# Patient Record
Sex: MALE | Race: WHITE | NOT HISPANIC OR LATINO | Employment: FULL TIME | URBAN - METROPOLITAN AREA
[De-identification: names, ages, dates, MRNs, and addresses within clinical notes are randomized per-mention and may not be internally consistent; named-entity substitution may affect disease eponyms.]

---

## 2019-04-16 ENCOUNTER — TELEPHONE (OUTPATIENT)
Dept: INTERNAL MEDICINE | Facility: CLINIC | Age: 64
End: 2019-04-16

## 2019-09-03 RX ORDER — ALBUTEROL SULFATE 90 UG/1
AEROSOL, METERED RESPIRATORY (INHALATION)
Qty: 54 G | Refills: 0 | Status: SHIPPED | OUTPATIENT
Start: 2019-09-03 | End: 2019-10-22 | Stop reason: SDUPTHER

## 2019-10-23 RX ORDER — ALBUTEROL SULFATE 90 UG/1
AEROSOL, METERED RESPIRATORY (INHALATION)
Qty: 54 G | Refills: 0 | Status: SHIPPED | OUTPATIENT
Start: 2019-10-23 | End: 2019-12-30

## 2019-11-22 PROBLEM — J98.01 BRONCHOSPASM: Status: ACTIVE | Noted: 2019-11-22

## 2019-11-22 PROBLEM — K57.90 DIVERTICULOSIS: Status: ACTIVE | Noted: 2019-11-22

## 2019-11-22 PROBLEM — N40.0 BENIGN PROSTATIC HYPERPLASIA WITHOUT LOWER URINARY TRACT SYMPTOMS: Status: ACTIVE | Noted: 2019-11-22

## 2019-11-22 PROBLEM — Z00.00 ROUTINE MEDICAL EXAM: Status: ACTIVE | Noted: 2019-11-22

## 2019-11-22 PROBLEM — R33.9 URINARY RETENTION: Status: ACTIVE | Noted: 2019-11-22

## 2019-11-22 PROBLEM — K64.8 INTERNAL HEMORRHOIDS: Status: ACTIVE | Noted: 2019-11-22

## 2019-12-11 ENCOUNTER — OFFICE VISIT (OUTPATIENT)
Dept: INTERNAL MEDICINE | Facility: CLINIC | Age: 64
End: 2019-12-11

## 2019-12-11 ENCOUNTER — LAB (OUTPATIENT)
Dept: LAB | Facility: HOSPITAL | Age: 64
End: 2019-12-11

## 2019-12-11 VITALS
HEIGHT: 71 IN | WEIGHT: 209 LBS | HEART RATE: 76 BPM | DIASTOLIC BLOOD PRESSURE: 90 MMHG | BODY MASS INDEX: 29.26 KG/M2 | SYSTOLIC BLOOD PRESSURE: 150 MMHG | TEMPERATURE: 95.9 F

## 2019-12-11 DIAGNOSIS — J98.01 BRONCHOSPASM: ICD-10-CM

## 2019-12-11 DIAGNOSIS — Z12.5 SCREENING FOR PROSTATE CANCER: ICD-10-CM

## 2019-12-11 DIAGNOSIS — E78.2 MIXED HYPERLIPIDEMIA: Primary | ICD-10-CM

## 2019-12-11 DIAGNOSIS — R73.01 IMPAIRED FASTING GLUCOSE: ICD-10-CM

## 2019-12-11 DIAGNOSIS — E78.2 MIXED HYPERLIPIDEMIA: ICD-10-CM

## 2019-12-11 DIAGNOSIS — Z00.00 ROUTINE MEDICAL EXAM: ICD-10-CM

## 2019-12-11 DIAGNOSIS — R03.0 ELEVATED BLOOD PRESSURE READING: ICD-10-CM

## 2019-12-11 PROCEDURE — 90471 IMMUNIZATION ADMIN: CPT | Performed by: NURSE PRACTITIONER

## 2019-12-11 PROCEDURE — G0103 PSA SCREENING: HCPCS

## 2019-12-11 PROCEDURE — 80061 LIPID PANEL: CPT

## 2019-12-11 PROCEDURE — 83036 HEMOGLOBIN GLYCOSYLATED A1C: CPT

## 2019-12-11 PROCEDURE — 99396 PREV VISIT EST AGE 40-64: CPT | Performed by: NURSE PRACTITIONER

## 2019-12-11 PROCEDURE — 93000 ELECTROCARDIOGRAM COMPLETE: CPT | Performed by: NURSE PRACTITIONER

## 2019-12-11 PROCEDURE — 82043 UR ALBUMIN QUANTITATIVE: CPT

## 2019-12-11 PROCEDURE — 90674 CCIIV4 VAC NO PRSV 0.5 ML IM: CPT | Performed by: NURSE PRACTITIONER

## 2019-12-11 PROCEDURE — 84443 ASSAY THYROID STIM HORMONE: CPT

## 2019-12-11 PROCEDURE — 85025 COMPLETE CBC W/AUTO DIFF WBC: CPT

## 2019-12-11 PROCEDURE — 82570 ASSAY OF URINE CREATININE: CPT

## 2019-12-11 PROCEDURE — 80053 COMPREHEN METABOLIC PANEL: CPT

## 2019-12-11 NOTE — PROGRESS NOTES
Luis Felipe Siegel  1955  [id number]  Patient Care Team:  Vi Solis APRN as PCP - General (Internal Medicine)  Provider, No Known as PCP - Family Medicine    Luis Felipe Siegel is a 64 y.o. pleasant male here today for annual physical.  Chief Complaint   Patient presents with   • Annual Exam     Patient is fasting       HPI:   Golfing some, walking over a mile from parking to work at [employer]  Noam:  Sees every April  Cat allergy:  Uses proair for wheezing dyspnea prn with cat allergen at home for years    Elevated bp, new:  Not high at other appts (dentist 3 x yr and Noam appt), no sx of HA, vision change, cp, sob, leg swelling, high in office today  BMI consistant with years prior.  etoh 2 beers per day (no change), no diet changes or change in activity level      Past Medical History:   Diagnosis Date   • Asthma    • Bronchospasm      to Animal Dander   • Positive PPD     in 1983     History reviewed. No pertinent surgical history.  Family History   Problem Relation Age of Onset   • COPD Mother    • Cancer Mother         lung?   • Atrial fibrillation Father    • Hypertension Father      Social History     Tobacco Use   Smoking Status Never Smoker   Smokeless Tobacco Never Used     No Known Allergies    Current Outpatient Medications:   •  tamsulosin (FLOMAX) 0.4 MG capsule 24 hr capsule, Take 1 capsule by mouth Every Night., Disp: , Rfl:   •  VENTOLIN  (90 Base) MCG/ACT inhaler, USE 2 PUFFS BY MOUTH EVERY  4 TO 6 HOURS AS NEEDED, Disp: 54 g, Rfl: 0    Review of Systems   Constitutional: Negative for chills, fatigue and fever.   HENT: Negative for congestion, ear pain and sinus pressure.    Respiratory: Negative for cough, chest tightness, shortness of breath and wheezing.    Cardiovascular: Negative for chest pain and palpitations.   Gastrointestinal: Negative for abdominal pain, blood in stool and constipation.   Skin: Negative for color change.   Allergic/Immunologic: Positive for environmental  "allergies.   Neurological: Negative for dizziness, speech difficulty and headache.   Psychiatric/Behavioral: Negative for decreased concentration. The patient is not nervous/anxious.        /90 (BP Location: Left arm)   Pulse 76   Temp 95.9 °F (35.5 °C) (Temporal)   Ht 180 cm (70.87\")   Wt 94.8 kg (209 lb)   BMI 29.26 kg/m²     Physical Exam   Constitutional: He appears well-developed and well-nourished.   HENT:   Head: Normocephalic and atraumatic.   Right Ear: External ear normal.   Left Ear: External ear normal.   Mouth/Throat: Oropharynx is clear and moist.   Eyes: Conjunctivae and EOM are normal.   Neck: Normal range of motion. Neck supple.   Cardiovascular: Normal rate, regular rhythm, normal heart sounds and intact distal pulses.   Pulmonary/Chest: Effort normal and breath sounds normal.   Abdominal: Soft. Bowel sounds are normal.   Musculoskeletal: Normal range of motion.   Neurological: He is alert.   Skin: Skin is warm and dry.   Psychiatric: He has a normal mood and affect. His behavior is normal. Thought content normal.       Results Review:  I reviewed the patient's new clinical results.      ECG 12 Lead  Date/Time: 12/11/2019 3:59 PM  Performed by: Vi Solis APRN  Authorized by: Vi Solis APRN   Comparison: compared with previous ECG from 11/14/2012  Comparison to previous ECG: NSR HR 66  Rhythm: sinus rhythm  Rate: normal  BPM: 67  QRS axis: normal    Clinical impression: normal ECG             Assessment/Plan:  Luis Felipe was seen today for annual exam.    Diagnoses and all orders for this visit:    Mixed hyperlipidemia  Comments:  Labs today, never on meds  Orders:  -     CBC & Differential; Future  -     Comprehensive Metabolic Panel; Future  -     Lipid Panel; Future  -     TSH Rfx On Abnormal To Free T4; Future  -     Microalbumin / Creatinine Urine Ratio - Urine, Clean Catch; Future    Impaired fasting glucose  Comments:  A1c today  Orders:  -     Hemoglobin A1c; " Future    Screening for prostate cancer  Comments:  PSA today, yearly visits with Dr. Boo  Orders:  -     PSA Screen; Future    Bronchospasm  Comments:  Use pro-air as needed    BMI 29.0-29.9,adult    Routine medical exam    Elevated blood pressure reading  Comments:  See below    Other orders  -     Flucelvax Quad=>4Years (PFS)  -     ECG 12 Lead         Patient Instructions   Consider Shingrix vaccine when available.    EKG today.  Is fasting for labs and will go this afternoon.  To see Dr. Santacruz for urology yearly.  Continue Flomax    bp higher than usual in office today.  Check BP at home and keep a log for your next visit.    •In general, adults should engage in aerobic physical activity 3-4 times a week with each session lasting an average of 40 minutes.  Goal for 150 minutes per week total.  •Moderate (brisk walking or jogging) to vigorous (running or biking) physical activity is recommended.  •There are many helpful strategies for heart-healthy eating, including the DASH diet and the [employer]'s Choose My Plate.   •Patients with high blood pressure should consume no more than 2,400 mg of sodium a day, ideally reducing sodium intake to 1,500 mg a day. However, even reducing sodium intake in one's current diet by 1,000 mg each day can help lower blood pressure.    Limit alcohol consumption.    Information obtained from the American College of Cardiology and American Heart Association.          Counseling/Anticipatory Guidance:   Plan of care reviewed with patient at the conclusion of today's visit. Education was provided in regards to diagnosis, diet and exercise, prostate cancer screening discussed including benefit of early detection, potential need for follow-up, and harms associated with additional management. Patient agrees to screening.    Nutrition, physical activity, healthy weight,ways to reduce stress, adequate sleep, injury prevention, misuse of tobacco, alcohol and drugs, sexual behavior and STD's,  dental health, mental health, and immunizations.    Plan of care reviewed with patient at the conclusion of today's visit. Education was provided regarding diagnosis, management and any prescribed or recommended OTC medications.  Patient verbalizes understanding of and agreement with management plan.    Return in about 1 year (around 12/11/2020) for Labs this visit, Annual physical.    * Please note that portions of this note were completed with a voice recognition program. Efforts were made to edit the dictation but occasionally words are transcribed.     Vi Solis, APRN

## 2019-12-11 NOTE — PATIENT INSTRUCTIONS
Consider Shingrix vaccine when available.    EKG today.  Is fasting for labs and will go this afternoon.  To see Dr. Santacruz for urology yearly.  Continue Flomax    bp higher than usual in office today.  Check BP at home and keep a log for your next visit.    •In general, adults should engage in aerobic physical activity 3-4 times a week with each session lasting an average of 40 minutes.  Goal for 150 minutes per week total.  •Moderate (brisk walking or jogging) to vigorous (running or biking) physical activity is recommended.  •There are many helpful strategies for heart-healthy eating, including the DASH diet and the [employer]'s Choose My Plate.   •Patients with high blood pressure should consume no more than 2,400 mg of sodium a day, ideally reducing sodium intake to 1,500 mg a day. However, even reducing sodium intake in one's current diet by 1,000 mg each day can help lower blood pressure.    Limit alcohol consumption.    Information obtained from the American College of Cardiology and American Heart Association.

## 2019-12-12 LAB
ALBUMIN SERPL-MCNC: 4.7 G/DL (ref 3.5–5.2)
ALBUMIN UR-MCNC: 1.9 MG/DL
ALBUMIN/GLOB SERPL: 1.3 G/DL
ALP SERPL-CCNC: 73 U/L (ref 39–117)
ALT SERPL W P-5'-P-CCNC: 26 U/L (ref 1–41)
ANION GAP SERPL CALCULATED.3IONS-SCNC: 11.3 MMOL/L (ref 5–15)
AST SERPL-CCNC: 20 U/L (ref 1–40)
BASOPHILS # BLD AUTO: 0.07 10*3/MM3 (ref 0–0.2)
BASOPHILS NFR BLD AUTO: 1.1 % (ref 0–1.5)
BILIRUB SERPL-MCNC: 0.6 MG/DL (ref 0.2–1.2)
BUN BLD-MCNC: 17 MG/DL (ref 8–23)
BUN/CREAT SERPL: 18.3 (ref 7–25)
CALCIUM SPEC-SCNC: 9.1 MG/DL (ref 8.6–10.5)
CHLORIDE SERPL-SCNC: 100 MMOL/L (ref 98–107)
CHOLEST SERPL-MCNC: 189 MG/DL (ref 0–200)
CO2 SERPL-SCNC: 27.7 MMOL/L (ref 22–29)
CREAT BLD-MCNC: 0.93 MG/DL (ref 0.76–1.27)
CREAT UR-MCNC: 175.1 MG/DL
DEPRECATED RDW RBC AUTO: 45.5 FL (ref 37–54)
EOSINOPHIL # BLD AUTO: 0.15 10*3/MM3 (ref 0–0.4)
EOSINOPHIL NFR BLD AUTO: 2.3 % (ref 0.3–6.2)
ERYTHROCYTE [DISTWIDTH] IN BLOOD BY AUTOMATED COUNT: 13.2 % (ref 12.3–15.4)
GFR SERPL CREATININE-BSD FRML MDRD: 82 ML/MIN/1.73
GLOBULIN UR ELPH-MCNC: 3.5 GM/DL
GLUCOSE BLD-MCNC: 93 MG/DL (ref 65–99)
HBA1C MFR BLD: 5.93 % (ref 4.8–5.6)
HCT VFR BLD AUTO: 43.9 % (ref 37.5–51)
HDLC SERPL-MCNC: 52 MG/DL (ref 40–60)
HGB BLD-MCNC: 15.1 G/DL (ref 13–17.7)
IMM GRANULOCYTES # BLD AUTO: 0.01 10*3/MM3 (ref 0–0.05)
IMM GRANULOCYTES NFR BLD AUTO: 0.2 % (ref 0–0.5)
LDLC SERPL CALC-MCNC: 105 MG/DL (ref 0–100)
LDLC/HDLC SERPL: 2.02 {RATIO}
LYMPHOCYTES # BLD AUTO: 1.88 10*3/MM3 (ref 0.7–3.1)
LYMPHOCYTES NFR BLD AUTO: 29.4 % (ref 19.6–45.3)
MCH RBC QN AUTO: 31.9 PG (ref 26.6–33)
MCHC RBC AUTO-ENTMCNC: 34.4 G/DL (ref 31.5–35.7)
MCV RBC AUTO: 92.6 FL (ref 79–97)
MICROALBUMIN/CREAT UR: 10.9 MG/G
MONOCYTES # BLD AUTO: 0.48 10*3/MM3 (ref 0.1–0.9)
MONOCYTES NFR BLD AUTO: 7.5 % (ref 5–12)
NEUTROPHILS # BLD AUTO: 3.81 10*3/MM3 (ref 1.7–7)
NEUTROPHILS NFR BLD AUTO: 59.5 % (ref 42.7–76)
NRBC BLD AUTO-RTO: 0 /100 WBC (ref 0–0.2)
PLATELET # BLD AUTO: 257 10*3/MM3 (ref 140–450)
PMV BLD AUTO: 9.4 FL (ref 6–12)
POTASSIUM BLD-SCNC: 4.5 MMOL/L (ref 3.5–5.2)
PROT SERPL-MCNC: 8.2 G/DL (ref 6–8.5)
PSA SERPL-MCNC: 1.05 NG/ML (ref 0–4)
RBC # BLD AUTO: 4.74 10*6/MM3 (ref 4.14–5.8)
SODIUM BLD-SCNC: 139 MMOL/L (ref 136–145)
TRIGL SERPL-MCNC: 161 MG/DL (ref 0–150)
TSH SERPL DL<=0.05 MIU/L-ACNC: 3.68 UIU/ML (ref 0.27–4.2)
VLDLC SERPL-MCNC: 32.2 MG/DL (ref 5–40)
WBC NRBC COR # BLD: 6.4 10*3/MM3 (ref 3.4–10.8)

## 2019-12-30 RX ORDER — ALBUTEROL SULFATE 90 UG/1
AEROSOL, METERED RESPIRATORY (INHALATION)
Qty: 54 G | Refills: 0 | Status: SHIPPED | OUTPATIENT
Start: 2019-12-30 | End: 2020-04-01

## 2020-01-04 ENCOUNTER — HOSPITAL ENCOUNTER (EMERGENCY)
Facility: HOSPITAL | Age: 65
Discharge: HOME | End: 2020-01-04
Attending: EMERGENCY MEDICINE | Admitting: EMERGENCY MEDICINE

## 2020-01-04 VITALS
BODY MASS INDEX: 29.4 KG/M2 | DIASTOLIC BLOOD PRESSURE: 85 MMHG | OXYGEN SATURATION: 100 % | WEIGHT: 210 LBS | RESPIRATION RATE: 18 BRPM | HEIGHT: 71 IN | SYSTOLIC BLOOD PRESSURE: 116 MMHG | HEART RATE: 78 BPM | TEMPERATURE: 98.1 F

## 2020-01-04 DIAGNOSIS — R33.8 ACUTE URINARY RETENTION: Primary | ICD-10-CM

## 2020-01-04 DIAGNOSIS — Z87.438 HISTORY OF BPH: ICD-10-CM

## 2020-01-04 LAB
ALBUMIN SERPL-MCNC: 4.8 G/DL (ref 3.5–5.2)
ALBUMIN/GLOB SERPL: 1.5 G/DL
ALP SERPL-CCNC: 84 U/L (ref 39–117)
ALT SERPL W P-5'-P-CCNC: 19 U/L (ref 1–41)
ANION GAP SERPL CALCULATED.3IONS-SCNC: 12 MMOL/L (ref 5–15)
AST SERPL-CCNC: 21 U/L (ref 1–40)
BACTERIA UR QL AUTO: ABNORMAL /HPF
BASOPHILS # BLD AUTO: 0.08 10*3/MM3 (ref 0–0.2)
BASOPHILS NFR BLD AUTO: 1.1 % (ref 0–1.5)
BILIRUB SERPL-MCNC: 0.5 MG/DL (ref 0.2–1.2)
BILIRUB UR QL STRIP: NEGATIVE
BUN BLD-MCNC: 22 MG/DL (ref 8–23)
BUN/CREAT SERPL: 23.7 (ref 7–25)
CALCIUM SPEC-SCNC: 9 MG/DL (ref 8.6–10.5)
CHLORIDE SERPL-SCNC: 102 MMOL/L (ref 98–107)
CLARITY UR: CLEAR
CO2 SERPL-SCNC: 25 MMOL/L (ref 22–29)
COLOR UR: YELLOW
CREAT BLD-MCNC: 0.93 MG/DL (ref 0.76–1.27)
DEPRECATED RDW RBC AUTO: 43.8 FL (ref 37–54)
EOSINOPHIL # BLD AUTO: 0.22 10*3/MM3 (ref 0–0.4)
EOSINOPHIL NFR BLD AUTO: 3 % (ref 0.3–6.2)
ERYTHROCYTE [DISTWIDTH] IN BLOOD BY AUTOMATED COUNT: 12.7 % (ref 12.3–15.4)
GFR SERPL CREATININE-BSD FRML MDRD: 82 ML/MIN/1.73
GLOBULIN UR ELPH-MCNC: 3.2 GM/DL
GLUCOSE BLD-MCNC: 100 MG/DL (ref 65–99)
GLUCOSE UR STRIP-MCNC: NEGATIVE MG/DL
HCT VFR BLD AUTO: 42.8 % (ref 37.5–51)
HGB BLD-MCNC: 14.6 G/DL (ref 13–17.7)
HGB UR QL STRIP.AUTO: ABNORMAL
HYALINE CASTS UR QL AUTO: ABNORMAL /LPF
IMM GRANULOCYTES # BLD AUTO: 0.02 10*3/MM3 (ref 0–0.05)
IMM GRANULOCYTES NFR BLD AUTO: 0.3 % (ref 0–0.5)
KETONES UR QL STRIP: NEGATIVE
LEUKOCYTE ESTERASE UR QL STRIP.AUTO: NEGATIVE
LYMPHOCYTES # BLD AUTO: 2.71 10*3/MM3 (ref 0.7–3.1)
LYMPHOCYTES NFR BLD AUTO: 36.4 % (ref 19.6–45.3)
MCH RBC QN AUTO: 32.1 PG (ref 26.6–33)
MCHC RBC AUTO-ENTMCNC: 34.1 G/DL (ref 31.5–35.7)
MCV RBC AUTO: 94.1 FL (ref 79–97)
MONOCYTES # BLD AUTO: 0.65 10*3/MM3 (ref 0.1–0.9)
MONOCYTES NFR BLD AUTO: 8.7 % (ref 5–12)
NEUTROPHILS # BLD AUTO: 3.76 10*3/MM3 (ref 1.7–7)
NEUTROPHILS NFR BLD AUTO: 50.5 % (ref 42.7–76)
NITRITE UR QL STRIP: NEGATIVE
NRBC BLD AUTO-RTO: 0 /100 WBC (ref 0–0.2)
PH UR STRIP.AUTO: <=5 [PH] (ref 5–8)
PLATELET # BLD AUTO: 244 10*3/MM3 (ref 140–450)
PMV BLD AUTO: 8.8 FL (ref 6–12)
POTASSIUM BLD-SCNC: 3.7 MMOL/L (ref 3.5–5.2)
PROT SERPL-MCNC: 8 G/DL (ref 6–8.5)
PROT UR QL STRIP: NEGATIVE
RBC # BLD AUTO: 4.55 10*6/MM3 (ref 4.14–5.8)
RBC # UR: ABNORMAL /HPF
REF LAB TEST METHOD: ABNORMAL
SODIUM BLD-SCNC: 139 MMOL/L (ref 136–145)
SP GR UR STRIP: 1.01 (ref 1–1.03)
SQUAMOUS #/AREA URNS HPF: ABNORMAL /HPF
UROBILINOGEN UR QL STRIP: ABNORMAL
WBC NRBC COR # BLD: 7.44 10*3/MM3 (ref 3.4–10.8)
WBC UR QL AUTO: ABNORMAL /HPF

## 2020-01-04 PROCEDURE — 80053 COMPREHEN METABOLIC PANEL: CPT | Performed by: PHYSICIAN ASSISTANT

## 2020-01-04 PROCEDURE — 99283 EMERGENCY DEPT VISIT LOW MDM: CPT

## 2020-01-04 PROCEDURE — 51702 INSERT TEMP BLADDER CATH: CPT

## 2020-01-04 PROCEDURE — 85025 COMPLETE CBC W/AUTO DIFF WBC: CPT | Performed by: PHYSICIAN ASSISTANT

## 2020-01-04 PROCEDURE — 81001 URINALYSIS AUTO W/SCOPE: CPT | Performed by: PHYSICIAN ASSISTANT

## 2020-01-04 PROCEDURE — 51798 US URINE CAPACITY MEASURE: CPT

## 2020-01-04 RX ORDER — LIDOCAINE HYDROCHLORIDE 20 MG/ML
11 JELLY TOPICAL ONCE
Status: COMPLETED | OUTPATIENT
Start: 2020-01-04 | End: 2020-01-04

## 2020-01-04 RX ADMIN — LIDOCAINE HYDROCHLORIDE 11 ML: 20 JELLY TOPICAL at 20:13

## 2020-01-05 NOTE — DISCHARGE INSTRUCTIONS
Have ER evaluation revealed acute urinary retention.  Patient with history of benign prostatic hypertrophy.  Continue with Flomax.  Post void residual is 84 mL and patient is urinating well on his own.  Recommend  close follow-up with Dr. Santacruz, routine urologist, for recheck.  Return if any worsening symptoms.

## 2020-01-05 NOTE — ED PROVIDER NOTES
"August Siegel is a 63 year old male complaining of urinary retention. The patient reports that for the past few days, he had a mildly \"hesitant stream,\" but that this was not enough to become concerning. However, this afternoon his urinary retention worsened significantly with only \"dribbles\" of urine throughout the day. He decided to come to the ED this evening for further evaluation of his urinary retention. The patient is uncertain of history of an enlarged prostate, though he does take Flomax per Dr. Santacruz. He presently denies any dysuria, but reports bladder distention and discomfort. He also denies a history of any abdominal surgeries. However, he does state that he had a Franco placed four years ago with significant difficulty. His urologist is Dr. Santacruz. His primary care physician is Vi Solis. There are no other acute complaints at this time.   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  He was actually painting some in his home and he had 3-4 beers.  Patient questions whether this affected his urinary output      History provided by:  Patient  Urinary Retention   Location:  N/A  Quality:  \"Dribbles\" of urine.  Severity:  Moderate  Onset quality:  Sudden  Timing:  Constant  Progression:  Worsening  Chronicity:  New  Relieved by:  None tried.  Ineffective treatments:  None tried.  Associated symptoms: abdominal pain (bladder distention)    Associated symptoms: no diarrhea, no fever, no nausea and no vomiting        Review of Systems   Constitutional: Negative for chills and fever.   Gastrointestinal: Positive for abdominal pain (bladder distention). Negative for constipation, diarrhea, nausea and vomiting.   Genitourinary: Positive for decreased urine volume and difficulty urinating. Negative for dysuria and hematuria.        Acute urinary retention   Musculoskeletal: Negative for back pain.   All other systems reviewed and are " negative.      Past Medical History:   Diagnosis Date   • Asthma    • Bronchospasm      to Animal Dander   • Positive PPD     in 1983       No Known Allergies    History reviewed. No pertinent surgical history.    Family History   Problem Relation Age of Onset   • COPD Mother    • Cancer Mother         lung?   • Atrial fibrillation Father    • Hypertension Father        Social History     Socioeconomic History   • Marital status:      Spouse name: Not on file   • Number of children: Not on file   • Years of education: Not on file   • Highest education level: Not on file   Tobacco Use   • Smoking status: Never Smoker   • Smokeless tobacco: Never Used   Substance and Sexual Activity   • Alcohol use: Yes     Alcohol/week: 7.0 - 14.0 standard drinks     Types: 7 - 14 Cans of beer per week     Frequency: Never   • Drug use: Never         Objective   Physical Exam   Constitutional: He is oriented to person, place, and time. He appears well-developed and well-nourished.   Appears uncomfortable secondary to bladder distension.   HENT:   Head: Normocephalic and atraumatic.   Nose: Nose normal.   Eyes: Conjunctivae are normal. No scleral icterus.   Neck: Normal range of motion. Neck supple.   Cardiovascular: Normal rate and regular rhythm.   No murmur heard.  Pulmonary/Chest: Effort normal and breath sounds normal. No respiratory distress.   Abdominal: Soft. He exhibits distension (Bladder distention noted with tenderness.). There is tenderness. There is no CVA tenderness.   Bowel sounds are active. No flank tenderness present.   Musculoskeletal: Normal range of motion.   Neurological: He is alert and oriented to person, place, and time.   Skin: Skin is warm and dry.   Psychiatric: He has a normal mood and affect. His behavior is normal.   Nursing note and vitals reviewed.      Procedures         ED Course  ED Course as of Jan 04 2144   Sat Jan 04, 2020 2020 Nursing staff had difficulty with catheterization due to  enlarged prostate.  Urojet was applied and patient actually had a large amount of urine output on his own.  We will perform bladder scan and assess post void residual.  If there is still quite a bit of residual urine, we will attempt with repeat catheterization.  Patient did have some relief with bladder distention.  CBC and chemistries were completely within normal limits.    [FC]   2058 Urinalysis revealed 13-20 red blood cells, but 0-2 white blood cells and no bacteria.  Leukocytes and nitrite were negative.    [FC]   2129 At bedside reevaluating the patient and updating on lab and imaging results. The patient states that he has urinated three times since being in the ER and feels significantly improved. He feels comfortable going home at this time.--FC    [RF]   2137 During the ER evaluation.  Bladder scan after post void residual is 84 mL of urine.  I reevaluated patient and he is feeling markedly improved.  He requests to be discharged home.  Catheterization attempt here in the ER tonight was difficult, and patient does not want to go through that again since he is urinating on his own.  He said that if symptoms worsen, he will return and I advised him to call his routine urologist, Dr. Santacruz for first available appointment.  Patient is agreeable.    [FC]      ED Course User Index  [FC] Marisela Barahona, ORION  [RF] Ashwini Chris     Recent Results (from the past 24 hour(s))   Comprehensive Metabolic Panel    Collection Time: 01/04/20  7:36 PM   Result Value Ref Range    Glucose 100 (H) 65 - 99 mg/dL    BUN 22 8 - 23 mg/dL    Creatinine 0.93 0.76 - 1.27 mg/dL    Sodium 139 136 - 145 mmol/L    Potassium 3.7 3.5 - 5.2 mmol/L    Chloride 102 98 - 107 mmol/L    CO2 25.0 22.0 - 29.0 mmol/L    Calcium 9.0 8.6 - 10.5 mg/dL    Total Protein 8.0 6.0 - 8.5 g/dL    Albumin 4.80 3.50 - 5.20 g/dL    ALT (SGPT) 19 1 - 41 U/L    AST (SGOT) 21 1 - 40 U/L    Alkaline Phosphatase 84 39 - 117 U/L    Total Bilirubin 0.5 0.2 - 1.2  mg/dL    eGFR Non African Amer 82 >60 mL/min/1.73    Globulin 3.2 gm/dL    A/G Ratio 1.5 g/dL    BUN/Creatinine Ratio 23.7 7.0 - 25.0    Anion Gap 12.0 5.0 - 15.0 mmol/L   CBC Auto Differential    Collection Time: 01/04/20  7:36 PM   Result Value Ref Range    WBC 7.44 3.40 - 10.80 10*3/mm3    RBC 4.55 4.14 - 5.80 10*6/mm3    Hemoglobin 14.6 13.0 - 17.7 g/dL    Hematocrit 42.8 37.5 - 51.0 %    MCV 94.1 79.0 - 97.0 fL    MCH 32.1 26.6 - 33.0 pg    MCHC 34.1 31.5 - 35.7 g/dL    RDW 12.7 12.3 - 15.4 %    RDW-SD 43.8 37.0 - 54.0 fl    MPV 8.8 6.0 - 12.0 fL    Platelets 244 140 - 450 10*3/mm3    Neutrophil % 50.5 42.7 - 76.0 %    Lymphocyte % 36.4 19.6 - 45.3 %    Monocyte % 8.7 5.0 - 12.0 %    Eosinophil % 3.0 0.3 - 6.2 %    Basophil % 1.1 0.0 - 1.5 %    Immature Grans % 0.3 0.0 - 0.5 %    Neutrophils, Absolute 3.76 1.70 - 7.00 10*3/mm3    Lymphocytes, Absolute 2.71 0.70 - 3.10 10*3/mm3    Monocytes, Absolute 0.65 0.10 - 0.90 10*3/mm3    Eosinophils, Absolute 0.22 0.00 - 0.40 10*3/mm3    Basophils, Absolute 0.08 0.00 - 0.20 10*3/mm3    Immature Grans, Absolute 0.02 0.00 - 0.05 10*3/mm3    nRBC 0.0 0.0 - 0.2 /100 WBC   Urinalysis With Culture If Indicated - Urine, Clean Catch    Collection Time: 01/04/20  8:29 PM   Result Value Ref Range    Color, UA Yellow Yellow, Straw    Appearance, UA Clear Clear    pH, UA <=5.0 5.0 - 8.0    Specific Gravity, UA 1.014 1.001 - 1.030    Glucose, UA Negative Negative    Ketones, UA Negative Negative    Bilirubin, UA Negative Negative    Blood, UA Moderate (2+) (A) Negative    Protein, UA Negative Negative    Leuk Esterase, UA Negative Negative    Nitrite, UA Negative Negative    Urobilinogen, UA 0.2 E.U./dL 0.2 - 1.0 E.U./dL   Urinalysis, Microscopic Only - Urine, Clean Catch    Collection Time: 01/04/20  8:29 PM   Result Value Ref Range    RBC, UA 13-20 (A) None Seen, 0-2 /HPF    WBC, UA 0-2 None Seen, 0-2 /HPF    Bacteria, UA None Seen None Seen, Trace /HPF    Squamous Epithelial  "Cells, UA 0-2 None Seen, 0-2 /HPF    Hyaline Casts, UA None Seen 0 - 6 /LPF    Methodology Automated Microscopy      Note: In addition to lab results from this visit, the labs listed above may include labs taken at another facility or during a different encounter within the last 24 hours. Please correlate lab times with ED admission and discharge times for further clarification of the services performed during this visit.    No orders to display     Vitals:    01/04/20 1852 01/04/20 2130   BP: 164/81 116/85   Pulse: 78    Resp: 18    Temp: 98.1 °F (36.7 °C)    SpO2: 100%    Weight: 95.3 kg (210 lb)    Height: 180.3 cm (71\")      Medications   Lidocaine HCl Urethral/Mucosal 2% (XYLOCAINE) gel 11 mL (11 mL Topical Given 1/4/20 2013)     ECG/EMG Results (last 24 hours)     ** No results found for the last 24 hours. **        No orders to display                     MDM    Final diagnoses:   Acute urinary retention   History of BPH       Documentation assistance provided by amador Chris.  Information recorded by the scribe was done at my direction and has been verified and validated by me.     Ashwini Chris  01/04/20 2051       Marisela Barahona PA-C  01/04/20 2144    "

## 2020-04-01 RX ORDER — ALBUTEROL SULFATE 90 UG/1
AEROSOL, METERED RESPIRATORY (INHALATION)
Qty: 54 G | Refills: 0 | Status: SHIPPED | OUTPATIENT
Start: 2020-04-01 | End: 2020-06-29

## 2020-06-29 RX ORDER — ALBUTEROL SULFATE 90 UG/1
AEROSOL, METERED RESPIRATORY (INHALATION)
Qty: 54 G | Refills: 0 | Status: SHIPPED | OUTPATIENT
Start: 2020-06-29 | End: 2020-09-14

## 2020-09-14 RX ORDER — ALBUTEROL SULFATE 90 UG/1
AEROSOL, METERED RESPIRATORY (INHALATION)
Qty: 54 G | Refills: 0 | Status: SHIPPED | OUTPATIENT
Start: 2020-09-14 | End: 2020-11-02

## 2020-10-27 RX ORDER — LISINOPRIL 5 MG/1
5 TABLET ORAL DAILY
Qty: 90 TABLET | Refills: 1 | Status: SHIPPED | OUTPATIENT
Start: 2020-10-27 | End: 2020-11-10

## 2020-11-02 RX ORDER — ALBUTEROL SULFATE 90 UG/1
AEROSOL, METERED RESPIRATORY (INHALATION)
Qty: 54 G | Refills: 2 | Status: SHIPPED | OUTPATIENT
Start: 2020-11-02 | End: 2022-06-27

## 2020-11-10 RX ORDER — AMLODIPINE BESYLATE 5 MG/1
5 TABLET ORAL DAILY
Qty: 30 TABLET | Refills: 2 | Status: SHIPPED | OUTPATIENT
Start: 2020-11-10 | End: 2020-12-16 | Stop reason: SDUPTHER

## 2020-12-16 ENCOUNTER — LAB (OUTPATIENT)
Dept: LAB | Facility: HOSPITAL | Age: 65
End: 2020-12-16

## 2020-12-16 ENCOUNTER — OFFICE VISIT (OUTPATIENT)
Dept: INTERNAL MEDICINE | Facility: CLINIC | Age: 65
End: 2020-12-16

## 2020-12-16 VITALS
SYSTOLIC BLOOD PRESSURE: 132 MMHG | DIASTOLIC BLOOD PRESSURE: 80 MMHG | BODY MASS INDEX: 29.01 KG/M2 | HEART RATE: 78 BPM | WEIGHT: 207.2 LBS | TEMPERATURE: 97.3 F | HEIGHT: 71 IN

## 2020-12-16 DIAGNOSIS — Z13.220 LIPID SCREENING: ICD-10-CM

## 2020-12-16 DIAGNOSIS — I10 ESSENTIAL HYPERTENSION: ICD-10-CM

## 2020-12-16 DIAGNOSIS — E78.2 MIXED HYPERLIPIDEMIA: ICD-10-CM

## 2020-12-16 DIAGNOSIS — Z00.00 ROUTINE MEDICAL EXAM: Primary | ICD-10-CM

## 2020-12-16 DIAGNOSIS — Z12.5 SCREENING FOR PROSTATE CANCER: ICD-10-CM

## 2020-12-16 LAB
ALBUMIN SERPL-MCNC: 4.6 G/DL (ref 3.5–5.2)
ALBUMIN UR-MCNC: <1.2 MG/DL
ALBUMIN/GLOB SERPL: 1.4 G/DL
ALP SERPL-CCNC: 79 U/L (ref 39–117)
ALT SERPL W P-5'-P-CCNC: 20 U/L (ref 1–41)
ANION GAP SERPL CALCULATED.3IONS-SCNC: 11.8 MMOL/L (ref 5–15)
AST SERPL-CCNC: 17 U/L (ref 1–40)
BASOPHILS # BLD AUTO: 0.09 10*3/MM3 (ref 0–0.2)
BASOPHILS NFR BLD AUTO: 1.5 % (ref 0–1.5)
BILIRUB SERPL-MCNC: 0.5 MG/DL (ref 0–1.2)
BUN SERPL-MCNC: 23 MG/DL (ref 8–23)
BUN/CREAT SERPL: 20.4 (ref 7–25)
CALCIUM SPEC-SCNC: 9.1 MG/DL (ref 8.6–10.5)
CHLORIDE SERPL-SCNC: 103 MMOL/L (ref 98–107)
CHOLEST SERPL-MCNC: 171 MG/DL (ref 0–200)
CO2 SERPL-SCNC: 24.2 MMOL/L (ref 22–29)
CREAT SERPL-MCNC: 1.13 MG/DL (ref 0.76–1.27)
CREAT UR-MCNC: 114.6 MG/DL
DEPRECATED RDW RBC AUTO: 43.2 FL (ref 37–54)
EOSINOPHIL # BLD AUTO: 0.14 10*3/MM3 (ref 0–0.4)
EOSINOPHIL NFR BLD AUTO: 2.4 % (ref 0.3–6.2)
ERYTHROCYTE [DISTWIDTH] IN BLOOD BY AUTOMATED COUNT: 12.8 % (ref 12.3–15.4)
GFR SERPL CREATININE-BSD FRML MDRD: 65 ML/MIN/1.73
GLOBULIN UR ELPH-MCNC: 3.2 GM/DL
GLUCOSE SERPL-MCNC: 107 MG/DL (ref 65–99)
HCT VFR BLD AUTO: 44.3 % (ref 37.5–51)
HDLC SERPL-MCNC: 56 MG/DL (ref 40–60)
HGB BLD-MCNC: 15.2 G/DL (ref 13–17.7)
IMM GRANULOCYTES # BLD AUTO: 0.03 10*3/MM3 (ref 0–0.05)
IMM GRANULOCYTES NFR BLD AUTO: 0.5 % (ref 0–0.5)
LDLC SERPL CALC-MCNC: 84 MG/DL (ref 0–100)
LDLC/HDLC SERPL: 1.39 {RATIO}
LYMPHOCYTES # BLD AUTO: 1.47 10*3/MM3 (ref 0.7–3.1)
LYMPHOCYTES NFR BLD AUTO: 25 % (ref 19.6–45.3)
MCH RBC QN AUTO: 32.3 PG (ref 26.6–33)
MCHC RBC AUTO-ENTMCNC: 34.3 G/DL (ref 31.5–35.7)
MCV RBC AUTO: 94.1 FL (ref 79–97)
MICROALBUMIN/CREAT UR: NORMAL MG/G{CREAT}
MONOCYTES # BLD AUTO: 0.4 10*3/MM3 (ref 0.1–0.9)
MONOCYTES NFR BLD AUTO: 6.8 % (ref 5–12)
NEUTROPHILS NFR BLD AUTO: 3.75 10*3/MM3 (ref 1.7–7)
NEUTROPHILS NFR BLD AUTO: 63.8 % (ref 42.7–76)
NRBC BLD AUTO-RTO: 0 /100 WBC (ref 0–0.2)
PLATELET # BLD AUTO: 265 10*3/MM3 (ref 140–450)
PMV BLD AUTO: 9.5 FL (ref 6–12)
POTASSIUM SERPL-SCNC: 4.2 MMOL/L (ref 3.5–5.2)
PROT SERPL-MCNC: 7.8 G/DL (ref 6–8.5)
PSA SERPL-MCNC: 0.7 NG/ML (ref 0–4)
RBC # BLD AUTO: 4.71 10*6/MM3 (ref 4.14–5.8)
SODIUM SERPL-SCNC: 139 MMOL/L (ref 136–145)
TRIGL SERPL-MCNC: 187 MG/DL (ref 0–150)
TSH SERPL DL<=0.05 MIU/L-ACNC: 2.44 UIU/ML (ref 0.27–4.2)
VLDLC SERPL-MCNC: 31 MG/DL (ref 5–40)
WBC # BLD AUTO: 5.88 10*3/MM3 (ref 3.4–10.8)

## 2020-12-16 PROCEDURE — G0103 PSA SCREENING: HCPCS

## 2020-12-16 PROCEDURE — 90686 IIV4 VACC NO PRSV 0.5 ML IM: CPT | Performed by: NURSE PRACTITIONER

## 2020-12-16 PROCEDURE — 84443 ASSAY THYROID STIM HORMONE: CPT

## 2020-12-16 PROCEDURE — 80061 LIPID PANEL: CPT

## 2020-12-16 PROCEDURE — 85025 COMPLETE CBC W/AUTO DIFF WBC: CPT

## 2020-12-16 PROCEDURE — 90471 IMMUNIZATION ADMIN: CPT | Performed by: NURSE PRACTITIONER

## 2020-12-16 PROCEDURE — 82570 ASSAY OF URINE CREATININE: CPT

## 2020-12-16 PROCEDURE — 82043 UR ALBUMIN QUANTITATIVE: CPT

## 2020-12-16 PROCEDURE — 93000 ELECTROCARDIOGRAM COMPLETE: CPT | Performed by: NURSE PRACTITIONER

## 2020-12-16 PROCEDURE — 80053 COMPREHEN METABOLIC PANEL: CPT

## 2020-12-16 PROCEDURE — 99396 PREV VISIT EST AGE 40-64: CPT | Performed by: NURSE PRACTITIONER

## 2020-12-16 RX ORDER — AMLODIPINE BESYLATE 10 MG/1
10 TABLET ORAL DAILY
Qty: 90 TABLET | Refills: 3 | Status: SHIPPED | OUTPATIENT
Start: 2020-12-16 | End: 2021-03-15

## 2020-12-16 NOTE — PROGRESS NOTES
Luis Felipe Siegel  1955  [id number]  Patient Care Team:  Vi Solis APRN as PCP - General (Internal Medicine)  Provider, No Known as PCP - Family Medicine  Moe Santacruz MD as Consulting Physician (Urology)    Luis Felipe Siegel is a 65 y.o. pleasant male here today for annual physical.  Chief Complaint   Patient presents with   • Annual Exam     patient is fasting   • Flu Vaccine       HPI:   Overall feeling well, some aches and pains in hip with golfing but mild.  HTN:  When tapering off flomax this summer bp started to elevate, tried lisnopirl first but had mild nausea; better after change to amlodipine 5 weeks ago.  130/80 avg some 120-140/80s. Golfing 1 x week avg, no other reg exercise, not walking to office now that he is working from home and not on [employer]. Eating worse during pandemic.  Was down to 195 before pandemic, now 207.  Was 210 last yr.    Second episode of urinary retnetion in Jan (1st 5 yrs ago).  Unable to place mckeon this time, saw Noam who attempted cystoscopy discovering urethral stricture, not BPH which was suspected previously.  After deciding was not BPH they dec to taper tamsulosin.  No taking every other day (did not stop abruptly due to inc. bp), nocturia 1 or 2 stream ok (no change)    colonoscopy due 2021, no blood in stool  Past Medical History:   Diagnosis Date   • Asthma    • Bronchospasm      to [employer]   • Positive PPD     in 1983     History reviewed. No pertinent surgical history.  Family History   Problem Relation Age of Onset   • COPD Mother    • Cancer Mother         lung?   • Atrial fibrillation Father    • Hypertension Father      Social History     Tobacco Use   Smoking Status Never Smoker   Smokeless Tobacco Never Used     No Known Allergies    Current Outpatient Medications:   •  amLODIPine (NORVASC) 10 MG tablet, Take 1 tablet by mouth Daily., Disp: 90 tablet, Rfl: 3  •  tamsulosin (FLOMAX) 0.4 MG capsule 24 hr capsule, Take 1 capsule by mouth Every  "Other Day., Disp: , Rfl:   •  Ventolin  (90 Base) MCG/ACT inhaler, USE 2 INHALATIONS BY MOUTH  EVERY 4 TO 6 HOURS AS  NEEDED, Disp: 54 g, Rfl: 2    Review of Systems   Constitutional: Negative for chills, fatigue and fever.   HENT: Negative for congestion, ear pain and sinus pressure.    Respiratory: Negative for cough, chest tightness, shortness of breath and wheezing.    Cardiovascular: Negative for chest pain and palpitations.   Gastrointestinal: Negative for abdominal pain, blood in stool and constipation.   Skin: Negative for color change.   Allergic/Immunologic: Positive for environmental allergies.   Neurological: Negative for dizziness, speech difficulty and headache.   Psychiatric/Behavioral: Negative for decreased concentration. The patient is not nervous/anxious.        /80   Pulse 78   Temp 97.3 °F (36.3 °C) (Infrared)   Ht 180.3 cm (71\")   Wt 94 kg (207 lb 3.2 oz)   BMI 28.90 kg/m²     Physical Exam  Vitals signs reviewed.   Constitutional:       Appearance: Normal appearance. He is well-developed.   HENT:      Head: Normocephalic.      Comments: *wearing mask     Right Ear: External ear normal.      Left Ear: External ear normal.   Eyes:      Conjunctiva/sclera: Conjunctivae normal.      Pupils: Pupils are equal, round, and reactive to light.   Neck:      Musculoskeletal: Normal range of motion and neck supple.   Cardiovascular:      Rate and Rhythm: Normal rate and regular rhythm.      Pulses: Normal pulses.      Heart sounds: Normal heart sounds.   Pulmonary:      Effort: Pulmonary effort is normal.      Breath sounds: Normal breath sounds.   Abdominal:      General: Abdomen is flat. Bowel sounds are normal.      Palpations: Abdomen is soft.   Musculoskeletal: Normal range of motion.      Right lower leg: No edema.      Left lower leg: No edema.   Skin:     General: Skin is warm and dry.   Neurological:      Mental Status: He is alert and oriented to person, place, and time. "   Psychiatric:         Mood and Affect: Mood normal.         Behavior: Behavior normal.         Thought Content: Thought content normal.         Judgment: Judgment normal.         Results Review:  I reviewed the patient's new clinical results.    PHQ-9 Total Score: 0      ECG 12 Lead    Date/Time: 12/16/2020 8:38 AM  Performed by: Vi Solis APRN  Authorized by: Vi Solis APRN   Comparison: compared with previous ECG from 12/11/2019  Similar to previous ECG  Rhythm: sinus rhythm and sinus arrhythmia  BPM: 69    Clinical impression: abnormal EKG             Assessment/Plan:  Diagnoses and all orders for this visit:    1. Routine medical exam (Primary)  Comments:  fasting labs today, get Shingrix at pharmacy, flu shot today     2. Essential hypertension  Comments:  increase amlodipine from 5 to 10mg daily  Orders:  -     CBC & Differential; Future  -     Comprehensive Metabolic Panel; Future  -     Microalbumin / Creatinine Urine Ratio - Urine, Clean Catch; Future  -     ECG 12 Lead    3. Mixed hyperlipidemia  Comments:  labs today  Orders:  -     Lipid Panel; Future  -     TSH Rfx On Abnormal To Free T4; Future    4. Screening for prostate cancer  -     PSA Screen; Future    Other orders  -     amLODIPine (NORVASC) 10 MG tablet; Take 1 tablet by mouth Daily.  Dispense: 90 tablet; Refill: 3  -     Fluarix/Fluzone/Afluria Quad>6 Months         Patient Instructions   Continue medications as prescribed.  Check BP at home and keep a log for your next visit.    •In general, adults should engage in aerobic physical activity 3-4 times a week with each session lasting an average of 40 minutes.  Goal for 150 minutes per week total.  •Moderate (brisk walking or jogging) to vigorous (running or biking) physical activity is recommended.  •There are many helpful strategies for heart-healthy eating, including the DASH diet and the [employer]'s Choose My Plate.   •Patients with high blood pressure should consume no more  than 2,400 mg of sodium a day, ideally reducing sodium intake to 1,500 mg a day. However, even reducing sodium intake in one's current diet by 1,000 mg each day can help lower blood pressure.    Limit alcohol consumption.    Information obtained from the American College of Cardiology and American Heart Association.    Preventive Care 40-64 Years Old, Male  Preventive care refers to lifestyle choices and visits with your health care provider that can promote health and wellness. This includes:  · A yearly physical exam. This is also called an annual well check.  · Regular dental and eye exams.  · Immunizations.  · Screening for certain conditions.  · Healthy lifestyle choices, such as eating a healthy diet, getting regular exercise, not using drugs or products that contain nicotine and tobacco, and limiting alcohol use.  What can I expect for my preventive care visit?  Physical exam  Your health care provider will check:  · Height and weight. These may be used to calculate body mass index (BMI), which is a measurement that tells if you are at a healthy weight.  · Heart rate and blood pressure.  · Your skin for abnormal spots.  Counseling  Your health care provider may ask you questions about:  · Alcohol, tobacco, and drug use.  · Emotional well-being.  · Home and relationship well-being.  · Sexual activity.  · Eating habits.  · Work and work environment.  What immunizations do I need?    Influenza (flu) vaccine  · This is recommended every year.  Tetanus, diphtheria, and pertussis (Tdap) vaccine  · You may need a Td booster every 10 years.  Varicella (chickenpox) vaccine  · You may need this vaccine if you have not already been vaccinated.  Zoster (shingles) vaccine  · You may need this after age 60.  Measles, mumps, and rubella (MMR) vaccine  · You may need at least one dose of MMR if you were born in 1957 or later. You may also need a second dose.  Pneumococcal conjugate (PCV13) vaccine  · You may need this if you  have certain conditions and were not previously vaccinated.  Pneumococcal polysaccharide (PPSV23) vaccine  · You may need one or two doses if you smoke cigarettes or if you have certain conditions.  Meningococcal conjugate (MenACWY) vaccine  · You may need this if you have certain conditions.  Hepatitis A vaccine  · You may need this if you have certain conditions or if you travel or work in places where you may be exposed to hepatitis A.  Hepatitis B vaccine  · You may need this if you have certain conditions or if you travel or work in places where you may be exposed to hepatitis B.  Haemophilus influenzae type b (Hib) vaccine  · You may need this if you have certain risk factors.  Human papillomavirus (HPV) vaccine  · If recommended by your health care provider, you may need three doses over 6 months.  You may receive vaccines as individual doses or as more than one vaccine together in one shot (combination vaccines). Talk with your health care provider about the risks and benefits of combination vaccines.  What tests do I need?  Blood tests  · Lipid and cholesterol levels. These may be checked every 5 years, or more frequently if you are over 50 years old.  · Hepatitis C test.  · Hepatitis B test.  Screening  · Lung cancer screening. You may have this screening every year starting at age 55 if you have a 30-pack-year history of smoking and currently smoke or have quit within the past 15 years.  · Prostate cancer screening. Recommendations will vary depending on your family history and other risks.  · Colorectal cancer screening. All adults should have this screening starting at age 50 and continuing until age 75. Your health care provider may recommend screening at age 45 if you are at increased risk. You will have tests every 1-10 years, depending on your results and the type of screening test.  · Diabetes screening. This is done by checking your blood sugar (glucose) after you have not eaten for a while  (fasting). You may have this done every 1-3 years.  · Sexually transmitted disease (STD) testing.  Follow these instructions at home:  Eating and drinking  · Eat a diet that includes fresh fruits and vegetables, whole grains, lean protein, and low-fat dairy products.  · Take vitamin and mineral supplements as recommended by your health care provider.  · Do not drink alcohol if your health care provider tells you not to drink.  · If you drink alcohol:  ? Limit how much you have to 0-2 drinks a day.  ? Be aware of how much alcohol is in your drink. In the [place] one drink equals one 12 oz bottle of beer (355 mL), one 5 oz glass of wine (148 mL), or one 1½ oz glass of hard liquor (44 mL).  Lifestyle  · Take daily care of your teeth and gums.  · Stay active. Exercise for at least 30 minutes on 5 or more days each week.  · Do not use any products that contain nicotine or tobacco, such as cigarettes, e-cigarettes, and chewing tobacco. If you need help quitting, ask your health care provider.  · If you are sexually active, practice safe sex. Use a condom or other form of protection to prevent STIs (sexually transmitted infections).  · Talk with your health care provider about taking a low-dose aspirin every day starting at age 50.  What's next?  · Go to your health care provider once a year for a well check visit.  · Ask your health care provider how often you should have your eyes and teeth checked.  · Stay up to date on all vaccines.  This information is not intended to replace advice given to you by your health care provider. Make sure you discuss any questions you have with your health care provider.  Document Revised: 12/12/2019 Document Reviewed: 12/12/2019  Elsevier Patient Education © 2020 Elsevier Inc.        Counseling/Anticipatory Guidance:   Plan of care reviewed with patient at the conclusion of today's visit. Education was provided in regards to diagnosis, diet and exercise, prostate cancer screening discussed  including benefit of early detection, potential need for follow-up, and harms associated with additional management. Patient agrees to screening.    Nutrition, physical activity, healthy weight,ways to reduce stress, adequate sleep, injury prevention, misuse of tobacco, alcohol and drugs, sexual behavior and STD's, dental health, mental health, and immunizations.    Plan of care reviewed with patient at the conclusion of today's visit. Education was provided regarding diagnosis, management and any prescribed or recommended OTC medications.  Patient verbalizes understanding of and agreement with management plan.    Return in about 6 months (around 6/16/2021) for Labs this visit.    * Please note that portions of this note were completed with a voice recognition program. Efforts were made to edit the dictation but occasionally words are transcribed.     TARI Leonardo

## 2020-12-16 NOTE — PATIENT INSTRUCTIONS
Continue medications as prescribed.  Check BP at home and keep a log for your next visit.    •In general, adults should engage in aerobic physical activity 3-4 times a week with each session lasting an average of 40 minutes.  Goal for 150 minutes per week total.  •Moderate (brisk walking or jogging) to vigorous (running or biking) physical activity is recommended.  •There are many helpful strategies for heart-healthy eating, including the DASH diet and the [employer]'s Choose My Plate.   •Patients with high blood pressure should consume no more than 2,400 mg of sodium a day, ideally reducing sodium intake to 1,500 mg a day. However, even reducing sodium intake in one's current diet by 1,000 mg each day can help lower blood pressure.    Limit alcohol consumption.    Information obtained from the American College of Cardiology and American Heart Association.    Preventive Care 40-64 Years Old, Male  Preventive care refers to lifestyle choices and visits with your health care provider that can promote health and wellness. This includes:  · A yearly physical exam. This is also called an annual well check.  · Regular dental and eye exams.  · Immunizations.  · Screening for certain conditions.  · Healthy lifestyle choices, such as eating a healthy diet, getting regular exercise, not using drugs or products that contain nicotine and tobacco, and limiting alcohol use.  What can I expect for my preventive care visit?  Physical exam  Your health care provider will check:  · Height and weight. These may be used to calculate body mass index (BMI), which is a measurement that tells if you are at a healthy weight.  · Heart rate and blood pressure.  · Your skin for abnormal spots.  Counseling  Your health care provider may ask you questions about:  · Alcohol, tobacco, and drug use.  · Emotional well-being.  · Home and relationship well-being.  · Sexual activity.  · Eating habits.  · Work and work environment.  What immunizations do I  need?    Influenza (flu) vaccine  · This is recommended every year.  Tetanus, diphtheria, and pertussis (Tdap) vaccine  · You may need a Td booster every 10 years.  Varicella (chickenpox) vaccine  · You may need this vaccine if you have not already been vaccinated.  Zoster (shingles) vaccine  · You may need this after age 60.  Measles, mumps, and rubella (MMR) vaccine  · You may need at least one dose of MMR if you were born in 1957 or later. You may also need a second dose.  Pneumococcal conjugate (PCV13) vaccine  · You may need this if you have certain conditions and were not previously vaccinated.  Pneumococcal polysaccharide (PPSV23) vaccine  · You may need one or two doses if you smoke cigarettes or if you have certain conditions.  Meningococcal conjugate (MenACWY) vaccine  · You may need this if you have certain conditions.  Hepatitis A vaccine  · You may need this if you have certain conditions or if you travel or work in places where you may be exposed to hepatitis A.  Hepatitis B vaccine  · You may need this if you have certain conditions or if you travel or work in places where you may be exposed to hepatitis B.  Haemophilus influenzae type b (Hib) vaccine  · You may need this if you have certain risk factors.  Human papillomavirus (HPV) vaccine  · If recommended by your health care provider, you may need three doses over 6 months.  You may receive vaccines as individual doses or as more than one vaccine together in one shot (combination vaccines). Talk with your health care provider about the risks and benefits of combination vaccines.  What tests do I need?  Blood tests  · Lipid and cholesterol levels. These may be checked every 5 years, or more frequently if you are over 50 years old.  · Hepatitis C test.  · Hepatitis B test.  Screening  · Lung cancer screening. You may have this screening every year starting at age 55 if you have a 30-pack-year history of smoking and currently smoke or have quit within  the past 15 years.  · Prostate cancer screening. Recommendations will vary depending on your family history and other risks.  · Colorectal cancer screening. All adults should have this screening starting at age 50 and continuing until age 75. Your health care provider may recommend screening at age 45 if you are at increased risk. You will have tests every 1-10 years, depending on your results and the type of screening test.  · Diabetes screening. This is done by checking your blood sugar (glucose) after you have not eaten for a while (fasting). You may have this done every 1-3 years.  · Sexually transmitted disease (STD) testing.  Follow these instructions at home:  Eating and drinking  · Eat a diet that includes fresh fruits and vegetables, whole grains, lean protein, and low-fat dairy products.  · Take vitamin and mineral supplements as recommended by your health care provider.  · Do not drink alcohol if your health care provider tells you not to drink.  · If you drink alcohol:  ? Limit how much you have to 0-2 drinks a day.  ? Be aware of how much alcohol is in your drink. In the [place] one drink equals one 12 oz bottle of beer (355 mL), one 5 oz glass of wine (148 mL), or one 1½ oz glass of hard liquor (44 mL).  Lifestyle  · Take daily care of your teeth and gums.  · Stay active. Exercise for at least 30 minutes on 5 or more days each week.  · Do not use any products that contain nicotine or tobacco, such as cigarettes, e-cigarettes, and chewing tobacco. If you need help quitting, ask your health care provider.  · If you are sexually active, practice safe sex. Use a condom or other form of protection to prevent STIs (sexually transmitted infections).  · Talk with your health care provider about taking a low-dose aspirin every day starting at age 50.  What's next?  · Go to your health care provider once a year for a well check visit.  · Ask your health care provider how often you should have your eyes and teeth  checked.  · Stay up to date on all vaccines.  This information is not intended to replace advice given to you by your health care provider. Make sure you discuss any questions you have with your health care provider.  Document Revised: 12/12/2019 Document Reviewed: 12/12/2019  Elsevier Patient Education © 2020 Elsevier Inc.

## 2020-12-23 DIAGNOSIS — R94.31 ABNORMAL EKG: ICD-10-CM

## 2020-12-23 DIAGNOSIS — I10 ESSENTIAL HYPERTENSION: Primary | ICD-10-CM

## 2020-12-23 DIAGNOSIS — E78.2 MIXED HYPERLIPIDEMIA: ICD-10-CM

## 2021-01-06 NOTE — PROGRESS NOTES
"Chief Complaint  Establish Care (abnormal ekg) and Hypertension    Subjective    History of Present Illness {CC  Problem List  Visit  Diagnosis   Encounters  Notes  Medications  Labs  Result Review Imaging  Media :23}     Luis Felipe Siegel presents to [facility] for abnormal EKG and HTN  History of Present Illness   64 YO M with hypertension and asthma who presents today for evaluation of abnormal EKG at the request of TARI Leonardo.  Patient had recent annual exam and had an EKG which showed normal sinus rhythm sinus arrhythmia.  He recently has been tapering off Flomax and has noticed that his blood pressure started to elevate.  He initially tried lisinopril had some mild nausea and blood pressures became worse, both improved after changing to amlodipine. This was increased to 10mg but he had to cut it down to 5mg due to swelling. He says that most of his SBPs are in the 140s.  He is asymptomatic.  He reports that he golfs regularly without any exertional symptoms.  He does not routinely exercise and currently is working from home.  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  He denies any chest pain, palpitations, shortness of breath, dizziness or lightheadedness.  No family history of premature CAD    Cardiac risks: HTN, age, gender  Objective     Vital Signs:   Vitals:    01/07/21 0807 01/07/21 0808 01/07/21 0809   BP: 156/80 136/73 143/73   BP Location: Right arm Left arm Left arm   Patient Position: Sitting Standing Sitting   Cuff Size: Adult Adult Adult   Pulse: 82 82 79   Resp:   20   Temp:   97.6 °F (36.4 °C)   TempSrc:   Temporal   SpO2: 100% 98% 100%   Weight:   95 kg (209 lb 8 oz)   Height:   180.3 cm (71\")     Body mass index is 29.22 kg/m².  Physical Exam  Vitals signs reviewed.   Constitutional:       Appearance: Normal appearance.   HENT:      Head: Normocephalic.   Eyes:      Extraocular Movements: Extraocular " movements intact.      Pupils: Pupils are equal, round, and reactive to light.   Neck:      Musculoskeletal: Neck supple.      Vascular: No carotid bruit.   Cardiovascular:      Rate and Rhythm: Normal rate and regular rhythm.      Pulses: Normal pulses.      Heart sounds: Normal heart sounds. No murmur.   Pulmonary:      Effort: Pulmonary effort is normal. No respiratory distress.      Breath sounds: Normal breath sounds.   Chest:      Chest wall: No tenderness.   Abdominal:      General: Abdomen is flat.      Palpations: Abdomen is soft.   Musculoskeletal:      Right lower leg: No edema.      Left lower leg: No edema.   Skin:     General: Skin is warm and dry.   Neurological:      General: No focal deficit present.      Mental Status: He is alert and oriented to person, place, and time. Mental status is at baseline.   Psychiatric:         Mood and Affect: Mood normal.         Behavior: Behavior normal.         Thought Content: Thought content normal.              Result Review  Data Reviewed:{ Labs  Result Review  Imaging  Med Tab  Media :23}     Cardiology studies ekg and Consultant notes Vi Murphytran [facility]   EKG 12/16/20 NSR with sinus arrhythmia     PSA Screen (12/16/2020 09:20)  Microalbumin / Creatinine Urine Ratio - Urine, Clean Catch (12/16/2020 09:20)  TSH Rfx On Abnormal To Free T4 (12/16/2020 09:20)  Lipid Panel (12/16/2020 09:20)  Comprehensive Metabolic Panel (12/16/2020 09:20)  CBC & Differential (12/16/2020 09:20)           Assessment and Plan {CC Problem List  Visit Diagnosis  ROS  Review (Popup)  Health Maintenance  Quality  BestPractice  Medications  SmartSets  SnapShot Encounters  Media :23}   1. Hypertension, unspecified type  Uncontrolled.  He is interested in starting ARB.  He would like to start valsartan 80 mg daily, which I agree with.  I sent in a prescription advised him to have his BMP repeated in 1 month.  Continue to monitor blood pressure and call if systolic with  pressures consistently greater than 130  Encouraged regular exercise  - Basic Metabolic Panel; Future    2. Abnormal EKG  Reviewed EKG which showed no significant abnormalities. Leads I and leads III appeared to have some artifact.  We had a long discussion about possible cardiac testing since he is asymptomatic and has no family history we both decided to not pursue any further cardiac testing.  I did recommend a coronary calcium scan for further cardiac risk stratification.  He defers this for right now            Follow Up {Instructions Charge Capture  Follow-up Communications :23}   Return if symptoms worsen or fail to improve.    Patient was given instructions and counseling regarding his condition or for health maintenance advice. Please see specific information pulled into the AVS if appropriate.  Patient was instructed to call the [facility] with any questions, concerns, or worsening symptoms.    *Please note that portions of this note were completed with a voice recognition program. Efforts were made to edit the dictations, but occasionally words are mistranscribed.

## 2021-01-07 ENCOUNTER — OFFICE VISIT (OUTPATIENT)
Dept: CARDIOLOGY | Facility: HOSPITAL | Age: 66
End: 2021-01-07

## 2021-01-07 VITALS
HEIGHT: 71 IN | HEART RATE: 79 BPM | RESPIRATION RATE: 20 BRPM | SYSTOLIC BLOOD PRESSURE: 143 MMHG | TEMPERATURE: 97.6 F | WEIGHT: 209.5 LBS | BODY MASS INDEX: 29.33 KG/M2 | DIASTOLIC BLOOD PRESSURE: 73 MMHG | OXYGEN SATURATION: 100 %

## 2021-01-07 DIAGNOSIS — R94.31 ABNORMAL EKG: ICD-10-CM

## 2021-01-07 DIAGNOSIS — I10 HYPERTENSION, UNSPECIFIED TYPE: Primary | ICD-10-CM

## 2021-01-07 PROCEDURE — 99214 OFFICE O/P EST MOD 30 MIN: CPT | Performed by: NURSE PRACTITIONER

## 2021-01-07 RX ORDER — VALSARTAN 40 MG/1
40 TABLET ORAL DAILY
Qty: 30 TABLET | Refills: 3 | Status: SHIPPED | OUTPATIENT
Start: 2021-01-07 | End: 2021-01-07 | Stop reason: SDUPTHER

## 2021-01-07 RX ORDER — VALSARTAN 80 MG/1
80 TABLET ORAL DAILY
Qty: 30 TABLET | Refills: 3 | Status: SHIPPED | OUTPATIENT
Start: 2021-01-07 | End: 2021-01-21 | Stop reason: SDUPTHER

## 2021-01-21 ENCOUNTER — PATIENT MESSAGE (OUTPATIENT)
Dept: INTERNAL MEDICINE | Facility: CLINIC | Age: 66
End: 2021-01-21

## 2021-01-21 ENCOUNTER — PATIENT MESSAGE (OUTPATIENT)
Dept: CARDIOLOGY | Facility: HOSPITAL | Age: 66
End: 2021-01-21

## 2021-01-21 RX ORDER — VALSARTAN 80 MG/1
80 TABLET ORAL DAILY
Qty: 90 TABLET | Refills: 1 | Status: SHIPPED | OUTPATIENT
Start: 2021-01-21 | End: 2021-03-15

## 2021-01-21 NOTE — TELEPHONE ENCOUNTER
From: Luis Felipe Siegel  To: TARI Leonardo  Sent: 1/21/2021 10:21 AM EST  Subject: Visit Follow-Up Question    FYI Cardiology NP started valsartan about 2 weeks ago. I went from 40 mg to 80 mg daily about 4 days ago. My BP the past 3 days is 115-130/65-80 without symptoms. I still am taking amlodipine 5 mg also. Thanks     Luis Felipe Siegel

## 2021-02-03 ENCOUNTER — LAB (OUTPATIENT)
Dept: LAB | Facility: HOSPITAL | Age: 66
End: 2021-02-03

## 2021-02-03 DIAGNOSIS — I10 HYPERTENSION, UNSPECIFIED TYPE: ICD-10-CM

## 2021-02-03 LAB
ANION GAP SERPL CALCULATED.3IONS-SCNC: 12.1 MMOL/L (ref 5–15)
BUN SERPL-MCNC: 28 MG/DL (ref 8–23)
BUN/CREAT SERPL: 29.2 (ref 7–25)
CALCIUM SPEC-SCNC: 9.3 MG/DL (ref 8.6–10.5)
CHLORIDE SERPL-SCNC: 104 MMOL/L (ref 98–107)
CO2 SERPL-SCNC: 25.9 MMOL/L (ref 22–29)
CREAT SERPL-MCNC: 0.96 MG/DL (ref 0.76–1.27)
GFR SERPL CREATININE-BSD FRML MDRD: 79 ML/MIN/1.73
GLUCOSE SERPL-MCNC: 114 MG/DL (ref 65–99)
POTASSIUM SERPL-SCNC: 4.4 MMOL/L (ref 3.5–5.2)
SODIUM SERPL-SCNC: 142 MMOL/L (ref 136–145)

## 2021-02-03 PROCEDURE — 80048 BASIC METABOLIC PNL TOTAL CA: CPT

## 2021-02-03 PROCEDURE — 36415 COLL VENOUS BLD VENIPUNCTURE: CPT

## 2021-03-13 ENCOUNTER — PATIENT MESSAGE (OUTPATIENT)
Dept: INTERNAL MEDICINE | Facility: CLINIC | Age: 66
End: 2021-03-13

## 2021-03-15 RX ORDER — AMLODIPINE AND VALSARTAN 5; 160 MG/1; MG/1
1 TABLET ORAL DAILY
Qty: 90 TABLET | Refills: 1 | Status: SHIPPED | OUTPATIENT
Start: 2021-03-15 | End: 2021-06-08

## 2021-03-15 NOTE — TELEPHONE ENCOUNTER
From: Luis Felipe Siegel  To: TARI Leonardo  Sent: 3/13/2021 4:55 PM EST  Subject: Prescription Question    After I saw Lizeth Alcantara in January, I was taking amlodipine 5 mg and valsartan 80 mg. My blood pressure was OK running mostly 120 to 130 systolic but I was running some systolic pressures in the 140s occasionally. Given that generic amlodipine/valsartan combo comes in 5/160 strength I doubled the valsartan to 160 mg to see how it would do and hopefully allow me to take only one pill per day. My blood pressures are typically 120s/70s in the am on this dose for the past 3 weeks. I am running low now on the valsartan 80 mg tablets. Can you order amlodipine/valsartan 5/160 strength, 90 day supply, through [employer]. Thanks

## 2021-06-08 RX ORDER — AMLODIPINE AND VALSARTAN 5; 160 MG/1; MG/1
TABLET ORAL
Qty: 90 TABLET | Refills: 3 | Status: SHIPPED | OUTPATIENT
Start: 2021-06-08 | End: 2022-01-14

## 2021-06-16 ENCOUNTER — LAB (OUTPATIENT)
Dept: LAB | Facility: HOSPITAL | Age: 66
End: 2021-06-16

## 2021-06-16 ENCOUNTER — OFFICE VISIT (OUTPATIENT)
Dept: INTERNAL MEDICINE | Facility: CLINIC | Age: 66
End: 2021-06-16

## 2021-06-16 VITALS
WEIGHT: 208 LBS | HEIGHT: 71 IN | SYSTOLIC BLOOD PRESSURE: 130 MMHG | DIASTOLIC BLOOD PRESSURE: 72 MMHG | TEMPERATURE: 98 F | HEART RATE: 72 BPM | BODY MASS INDEX: 29.12 KG/M2

## 2021-06-16 DIAGNOSIS — E78.2 MIXED HYPERLIPIDEMIA: ICD-10-CM

## 2021-06-16 DIAGNOSIS — I10 ESSENTIAL HYPERTENSION: Primary | ICD-10-CM

## 2021-06-16 DIAGNOSIS — E66.3 OVERWEIGHT (BMI 25.0-29.9): ICD-10-CM

## 2021-06-16 DIAGNOSIS — I10 ESSENTIAL HYPERTENSION: ICD-10-CM

## 2021-06-16 DIAGNOSIS — R73.01 IMPAIRED FASTING GLUCOSE: ICD-10-CM

## 2021-06-16 LAB
ALBUMIN SERPL-MCNC: 4.6 G/DL (ref 3.5–5.2)
ALBUMIN UR-MCNC: <1.2 MG/DL
ALBUMIN/GLOB SERPL: 1.6 G/DL
ALP SERPL-CCNC: 75 U/L (ref 39–117)
ALT SERPL W P-5'-P-CCNC: 19 U/L (ref 1–41)
ANION GAP SERPL CALCULATED.3IONS-SCNC: 9.1 MMOL/L (ref 5–15)
AST SERPL-CCNC: 19 U/L (ref 1–40)
BASOPHILS # BLD AUTO: 0.07 10*3/MM3 (ref 0–0.2)
BASOPHILS NFR BLD AUTO: 1.2 % (ref 0–1.5)
BILIRUB SERPL-MCNC: 0.5 MG/DL (ref 0–1.2)
BUN SERPL-MCNC: 21 MG/DL (ref 8–23)
BUN/CREAT SERPL: 21.6 (ref 7–25)
CALCIUM SPEC-SCNC: 8.9 MG/DL (ref 8.6–10.5)
CHLORIDE SERPL-SCNC: 102 MMOL/L (ref 98–107)
CHOLEST SERPL-MCNC: 173 MG/DL (ref 0–200)
CO2 SERPL-SCNC: 24.9 MMOL/L (ref 22–29)
CREAT SERPL-MCNC: 0.97 MG/DL (ref 0.76–1.27)
CREAT UR-MCNC: 123.3 MG/DL
DEPRECATED RDW RBC AUTO: 45.9 FL (ref 37–54)
EOSINOPHIL # BLD AUTO: 0.14 10*3/MM3 (ref 0–0.4)
EOSINOPHIL NFR BLD AUTO: 2.4 % (ref 0.3–6.2)
ERYTHROCYTE [DISTWIDTH] IN BLOOD BY AUTOMATED COUNT: 13 % (ref 12.3–15.4)
GFR SERPL CREATININE-BSD FRML MDRD: 78 ML/MIN/1.73
GLOBULIN UR ELPH-MCNC: 2.9 GM/DL
GLUCOSE SERPL-MCNC: 105 MG/DL (ref 65–99)
HCT VFR BLD AUTO: 45.5 % (ref 37.5–51)
HDLC SERPL-MCNC: 51 MG/DL (ref 40–60)
HGB BLD-MCNC: 15.6 G/DL (ref 13–17.7)
IMM GRANULOCYTES # BLD AUTO: 0.01 10*3/MM3 (ref 0–0.05)
IMM GRANULOCYTES NFR BLD AUTO: 0.2 % (ref 0–0.5)
LDLC SERPL CALC-MCNC: 94 MG/DL (ref 0–100)
LDLC/HDLC SERPL: 1.75 {RATIO}
LYMPHOCYTES # BLD AUTO: 1.5 10*3/MM3 (ref 0.7–3.1)
LYMPHOCYTES NFR BLD AUTO: 25.5 % (ref 19.6–45.3)
MCH RBC QN AUTO: 32.9 PG (ref 26.6–33)
MCHC RBC AUTO-ENTMCNC: 34.3 G/DL (ref 31.5–35.7)
MCV RBC AUTO: 96 FL (ref 79–97)
MICROALBUMIN/CREAT UR: NORMAL MG/G{CREAT}
MONOCYTES # BLD AUTO: 0.48 10*3/MM3 (ref 0.1–0.9)
MONOCYTES NFR BLD AUTO: 8.2 % (ref 5–12)
NEUTROPHILS NFR BLD AUTO: 3.68 10*3/MM3 (ref 1.7–7)
NEUTROPHILS NFR BLD AUTO: 62.5 % (ref 42.7–76)
NRBC BLD AUTO-RTO: 0 /100 WBC (ref 0–0.2)
PLATELET # BLD AUTO: 234 10*3/MM3 (ref 140–450)
PMV BLD AUTO: 9.7 FL (ref 6–12)
POTASSIUM SERPL-SCNC: 4.7 MMOL/L (ref 3.5–5.2)
PROT SERPL-MCNC: 7.5 G/DL (ref 6–8.5)
RBC # BLD AUTO: 4.74 10*6/MM3 (ref 4.14–5.8)
SODIUM SERPL-SCNC: 136 MMOL/L (ref 136–145)
TRIGL SERPL-MCNC: 165 MG/DL (ref 0–150)
VLDLC SERPL-MCNC: 28 MG/DL (ref 5–40)
WBC # BLD AUTO: 5.88 10*3/MM3 (ref 3.4–10.8)

## 2021-06-16 PROCEDURE — 82570 ASSAY OF URINE CREATININE: CPT

## 2021-06-16 PROCEDURE — 80061 LIPID PANEL: CPT

## 2021-06-16 PROCEDURE — 83036 HEMOGLOBIN GLYCOSYLATED A1C: CPT

## 2021-06-16 PROCEDURE — 99214 OFFICE O/P EST MOD 30 MIN: CPT | Performed by: NURSE PRACTITIONER

## 2021-06-16 PROCEDURE — 82043 UR ALBUMIN QUANTITATIVE: CPT

## 2021-06-16 PROCEDURE — 80053 COMPREHEN METABOLIC PANEL: CPT

## 2021-06-16 PROCEDURE — 85025 COMPLETE CBC W/AUTO DIFF WBC: CPT

## 2021-06-16 NOTE — PROGRESS NOTES
Luis Felipe Siegel  1955  [id number]  Patient Care Team:  Vi Solis APRN as PCP - General (Internal Medicine)  Provider, No Known as PCP - Family Medicine  Moe Santacruz MD as Consulting Physician (Urology)    Luis Felipe Siegel is a pleasant 66 y.o. male who presents for evaluation of Hypertension      Chief Complaint   Patient presents with   • Hypertension       HPI:   HTN f/u:  Saw cards in Jan for baseline eval.  bp was up some last year and we increased medication, inc of amlodipine from 5-10 caused LE edema so it was dec back to 5 and valsartan was added.  Valsartan titrated up from 40, then 80, then 160 and bp running 115-122/65-75 for last several months. Three weeks ago started walking at lunch about a mile (longer on the weekends) to prepare for upcoming golf trip.  Leg stiffness and general soreness is already better with addition of exercise.  Did have asymptomatic bp reading 96/45 after recent walk.    Past Medical History:   Diagnosis Date   • Asthma    • Bronchospasm      to [employer]   • Positive PPD     in 1983     Past Surgical History:   Procedure Laterality Date   • NO PAST SURGERIES       Family History   Problem Relation Age of Onset   • COPD Mother    • Cancer Mother         lung?   • Atrial fibrillation Father    • Hypertension Father    • No Known Problems Brother    • Hypertension Maternal Grandmother    • Hypertension Maternal Grandfather    • No Known Problems Paternal Grandmother    • Prostate cancer Paternal Grandfather      Social History     Tobacco Use   Smoking Status Never Smoker   Smokeless Tobacco Never Used     No Known Allergies    Current Outpatient Medications:   •  amLODIPine-valsartan (EXFORGE) 5-160 MG per tablet, TAKE 1 TABLET BY MOUTH  DAILY, Disp: 90 tablet, Rfl: 3  •  Ventolin  (90 Base) MCG/ACT inhaler, USE 2 INHALATIONS BY MOUTH  EVERY 4 TO 6 HOURS AS  NEEDED, Disp: 54 g, Rfl: 2    Review of Systems   Eyes: Negative for blurred vision.  "  Respiratory: Negative for shortness of breath.    Cardiovascular: Negative for chest pain and palpitations.   Musculoskeletal: Negative for neck pain.     /72 (BP Location: Left arm, Patient Position: Sitting, Cuff Size: Adult)   Pulse 72   Temp 98 °F (36.7 °C) (Temporal)   Ht 180.3 cm (70.98\")   Wt 94.3 kg (208 lb)   BMI 29.02 kg/m²     Physical Exam  Constitutional:       Appearance: He is well-developed.   HENT:      Head: Normocephalic and atraumatic.      Comments: *wearing mask  Eyes:      Conjunctiva/sclera: Conjunctivae normal.      Pupils: Pupils are equal, round, and reactive to light.   Cardiovascular:      Rate and Rhythm: Normal rate and regular rhythm.      Pulses: Normal pulses.      Heart sounds: Normal heart sounds.   Pulmonary:      Effort: Pulmonary effort is normal.      Breath sounds: Normal breath sounds.   Musculoskeletal:         General: Normal range of motion.      Cervical back: Normal range of motion and neck supple.   Skin:     General: Skin is warm and dry.   Neurological:      Mental Status: He is alert and oriented to person, place, and time.   Psychiatric:         Mood and Affect: Mood normal.         Behavior: Behavior normal.         Thought Content: Thought content normal.         Judgment: Judgment normal.         Procedures    Results Review:  None    PHQ-9 Total Score: 0    Assessment/Plan:  Diagnoses and all orders for this visit:    1. Essential hypertension (Primary)  Comments:  continue current meds  Orders:  -     CBC & Differential; Future  -     Comprehensive Metabolic Panel; Future  -     Microalbumin / Creatinine Urine Ratio - Urine, Clean Catch; Future    2. Mixed hyperlipidemia  Comments:  fasting labs today  Orders:  -     Lipid Panel; Future    3. Overweight (BMI 25.0-29.9)  Comments:  continue reg exercise       Patient Instructions   Continue medications as prescribed.  Check BP at home and keep a log for your next visit.    •In general, adults " should engage in aerobic physical activity 3-4 times a week with each session lasting an average of 40 minutes.  Goal for 150 minutes per week total.  •Moderate (brisk walking or jogging) to vigorous (running or biking) physical activity is recommended.  •There are many helpful strategies for heart-healthy eating, including the DASH diet and the [employer]'s Choose My Plate.   •Patients with high blood pressure should consume no more than 2,400 mg of sodium a day, ideally reducing sodium intake to 1,500 mg a day. However, even reducing sodium intake in one's current diet by 1,000 mg each day can help lower blood pressure.    Limit alcohol consumption.    Information obtained from the American College of Cardiology and American Heart Association.      Plan of care reviewed with patient at the conclusion of today's visit. Education was provided regarding diagnosis, management and any prescribed or recommended OTC medications.  Patient verbalizes understanding of and agreement with management plan.    Return in about 6 months (around 12/16/2021) for Labs this visit, Medicare Wellness.    *Note that portions of this note were completed with a voice recognition program.  Efforts were made to edit the dictation but occasionally words are transcribed.    TARI Leonardo          Answers for HPI/ROS submitted by the patient on 6/14/2021  What is the primary reason for your visit?: High Blood Pressure  Chronicity: recurrent  Onset: more than 1 year ago  Progression since onset: resolved  Condition status: controlled  anxiety: No  headaches: No  malaise/fatigue: No  orthopnea: No  peripheral edema: No  PND: No  sweats: No  CAD risks: family history  Compliance problems: no compliance problems

## 2021-06-16 NOTE — PATIENT INSTRUCTIONS
Continue medications as prescribed.  Check BP at home and keep a log for your next visit.    •In general, adults should engage in aerobic physical activity 3-4 times a week with each session lasting an average of 40 minutes.  Goal for 150 minutes per week total.  •Moderate (brisk walking or jogging) to vigorous (running or biking) physical activity is recommended.  •There are many helpful strategies for heart-healthy eating, including the DASH diet and the [employer]'s Choose My Plate.   •Patients with high blood pressure should consume no more than 2,400 mg of sodium a day, ideally reducing sodium intake to 1,500 mg a day. However, even reducing sodium intake in one's current diet by 1,000 mg each day can help lower blood pressure.    Limit alcohol consumption.    Information obtained from the American College of Cardiology and American Heart Association.

## 2021-06-18 LAB — HBA1C MFR BLD: 5.5 % (ref 4.8–5.6)

## 2021-09-01 ENCOUNTER — PATIENT MESSAGE (OUTPATIENT)
Dept: INTERNAL MEDICINE | Facility: CLINIC | Age: 66
End: 2021-09-01

## 2021-09-02 NOTE — TELEPHONE ENCOUNTER
From: Luis Felipe Siegel  To: TARI Leonardo  Sent: 9/1/2021 5:41 PM EDT  Subject: Non-Urgent Medical Question    Jeffrey Hodges. A week ago Friday I noticed I was getting lightheaded occasionally and my BP was 100/60. Since then I have been taking only amlodipine 5mg daily. I have been cutting the 10mg tablets you gave me the end of last year in half. My BP is running now about 115-130/70-80, today being 116/74. I think it might be best to take the 5mg tablet rather than trying to cut 9 month old 10 mg tablets. Can you send a Rx to [facility] for amlodipine 5mg daily for 30 days? I know I can do that myself but I do not want to be my own physician. Thanks Luis Felipe

## 2021-09-03 RX ORDER — AMLODIPINE BESYLATE 5 MG/1
5 TABLET ORAL DAILY
Qty: 30 TABLET | Refills: 2 | Status: SHIPPED | OUTPATIENT
Start: 2021-09-03 | End: 2021-12-17

## 2021-12-17 ENCOUNTER — LAB (OUTPATIENT)
Dept: LAB | Facility: HOSPITAL | Age: 66
End: 2021-12-17

## 2021-12-17 ENCOUNTER — OFFICE VISIT (OUTPATIENT)
Dept: INTERNAL MEDICINE | Facility: CLINIC | Age: 66
End: 2021-12-17

## 2021-12-17 VITALS
BODY MASS INDEX: 29.55 KG/M2 | WEIGHT: 206.4 LBS | SYSTOLIC BLOOD PRESSURE: 130 MMHG | DIASTOLIC BLOOD PRESSURE: 84 MMHG | HEIGHT: 70 IN | TEMPERATURE: 98.4 F | HEART RATE: 80 BPM

## 2021-12-17 DIAGNOSIS — I10 ESSENTIAL HYPERTENSION: ICD-10-CM

## 2021-12-17 DIAGNOSIS — E78.2 MIXED HYPERLIPIDEMIA: ICD-10-CM

## 2021-12-17 DIAGNOSIS — I10 ESSENTIAL HYPERTENSION: Primary | ICD-10-CM

## 2021-12-17 LAB
ALBUMIN SERPL-MCNC: 4.8 G/DL (ref 3.5–5.2)
ALBUMIN UR-MCNC: <1.2 MG/DL
ALBUMIN/GLOB SERPL: 1.7 G/DL
ALP SERPL-CCNC: 70 U/L (ref 39–117)
ALT SERPL W P-5'-P-CCNC: 14 U/L (ref 1–41)
ANION GAP SERPL CALCULATED.3IONS-SCNC: 7.7 MMOL/L (ref 5–15)
AST SERPL-CCNC: 15 U/L (ref 1–40)
BASOPHILS # BLD AUTO: 0.07 10*3/MM3 (ref 0–0.2)
BASOPHILS NFR BLD AUTO: 1.3 % (ref 0–1.5)
BILIRUB SERPL-MCNC: 0.5 MG/DL (ref 0–1.2)
BUN SERPL-MCNC: 21 MG/DL (ref 8–23)
BUN/CREAT SERPL: 19.8 (ref 7–25)
CALCIUM SPEC-SCNC: 9.1 MG/DL (ref 8.6–10.5)
CHLORIDE SERPL-SCNC: 103 MMOL/L (ref 98–107)
CHOLEST SERPL-MCNC: 176 MG/DL (ref 0–200)
CO2 SERPL-SCNC: 27.3 MMOL/L (ref 22–29)
CREAT SERPL-MCNC: 1.06 MG/DL (ref 0.76–1.27)
CREAT UR-MCNC: 123.7 MG/DL
DEPRECATED RDW RBC AUTO: 46.2 FL (ref 37–54)
EOSINOPHIL # BLD AUTO: 0.13 10*3/MM3 (ref 0–0.4)
EOSINOPHIL NFR BLD AUTO: 2.3 % (ref 0.3–6.2)
ERYTHROCYTE [DISTWIDTH] IN BLOOD BY AUTOMATED COUNT: 12.8 % (ref 12.3–15.4)
GFR SERPL CREATININE-BSD FRML MDRD: 70 ML/MIN/1.73
GLOBULIN UR ELPH-MCNC: 2.8 GM/DL
GLUCOSE SERPL-MCNC: 105 MG/DL (ref 65–99)
HCT VFR BLD AUTO: 47.3 % (ref 37.5–51)
HDLC SERPL-MCNC: 56 MG/DL (ref 40–60)
HGB BLD-MCNC: 15.4 G/DL (ref 13–17.7)
IMM GRANULOCYTES # BLD AUTO: 0.02 10*3/MM3 (ref 0–0.05)
IMM GRANULOCYTES NFR BLD AUTO: 0.4 % (ref 0–0.5)
LDLC SERPL CALC-MCNC: 86 MG/DL (ref 0–100)
LDLC/HDLC SERPL: 1.41 {RATIO}
LYMPHOCYTES # BLD AUTO: 1.31 10*3/MM3 (ref 0.7–3.1)
LYMPHOCYTES NFR BLD AUTO: 23.4 % (ref 19.6–45.3)
MCH RBC QN AUTO: 31.8 PG (ref 26.6–33)
MCHC RBC AUTO-ENTMCNC: 32.6 G/DL (ref 31.5–35.7)
MCV RBC AUTO: 97.5 FL (ref 79–97)
MICROALBUMIN/CREAT UR: NORMAL MG/G{CREAT}
MONOCYTES # BLD AUTO: 0.47 10*3/MM3 (ref 0.1–0.9)
MONOCYTES NFR BLD AUTO: 8.4 % (ref 5–12)
NEUTROPHILS NFR BLD AUTO: 3.6 10*3/MM3 (ref 1.7–7)
NEUTROPHILS NFR BLD AUTO: 64.2 % (ref 42.7–76)
NRBC BLD AUTO-RTO: 0 /100 WBC (ref 0–0.2)
PLATELET # BLD AUTO: 260 10*3/MM3 (ref 140–450)
PMV BLD AUTO: 9.9 FL (ref 6–12)
POTASSIUM SERPL-SCNC: 4.4 MMOL/L (ref 3.5–5.2)
PROT SERPL-MCNC: 7.6 G/DL (ref 6–8.5)
RBC # BLD AUTO: 4.85 10*6/MM3 (ref 4.14–5.8)
SODIUM SERPL-SCNC: 138 MMOL/L (ref 136–145)
TRIGL SERPL-MCNC: 204 MG/DL (ref 0–150)
TSH SERPL DL<=0.05 MIU/L-ACNC: 3.06 UIU/ML (ref 0.27–4.2)
VLDLC SERPL-MCNC: 34 MG/DL (ref 5–40)
WBC NRBC COR # BLD: 5.6 10*3/MM3 (ref 3.4–10.8)

## 2021-12-17 PROCEDURE — 82043 UR ALBUMIN QUANTITATIVE: CPT

## 2021-12-17 PROCEDURE — 90662 IIV NO PRSV INCREASED AG IM: CPT | Performed by: NURSE PRACTITIONER

## 2021-12-17 PROCEDURE — 80053 COMPREHEN METABOLIC PANEL: CPT

## 2021-12-17 PROCEDURE — 80061 LIPID PANEL: CPT

## 2021-12-17 PROCEDURE — 82570 ASSAY OF URINE CREATININE: CPT

## 2021-12-17 PROCEDURE — 90471 IMMUNIZATION ADMIN: CPT | Performed by: NURSE PRACTITIONER

## 2021-12-17 PROCEDURE — 99397 PER PM REEVAL EST PAT 65+ YR: CPT | Performed by: NURSE PRACTITIONER

## 2021-12-17 PROCEDURE — 85025 COMPLETE CBC W/AUTO DIFF WBC: CPT

## 2021-12-17 PROCEDURE — 84443 ASSAY THYROID STIM HORMONE: CPT

## 2021-12-17 NOTE — PROGRESS NOTES
"QUICK REFERENCE INFORMATION:  The ABCs of the Annual Wellness Visit    Welcome to Medicare Visit    HEALTH RISK ASSESSMENT    1955    Recent Hospitalizations:  {Hospitalization history:4457644300::\"No hospitalization(s) within the last year.\"}.      Current Medical Providers:  Patient Care Team:  Vi Solis APRN as PCP - General (Internal Medicine)  Provider, No Known as PCP - Family Medicine  Moe Santacruz MD as Consulting Physician (Urology)      Smoking Status:  Social History     Tobacco Use   Smoking Status Never Smoker   Smokeless Tobacco Never Used       Alcohol Consumption:  Social History     Substance and Sexual Activity   Alcohol Use Yes   • Alcohol/week: 7.0 - 14.0 standard drinks   • Types: 7 - 14 Cans of beer per week       Depression Screen:   PHQ-2/PHQ-9 Depression Screening 6/16/2021   Little interest or pleasure in doing things 0   Feeling down, depressed, or hopeless 0   Total Score 0       Health Habits and Functional and Cognitive Screening:  No flowsheet data found.    Fall Risk Screen:  STEADI Fall Risk Assessment was completed, and patient is at LOW risk for falls.Assessment completed on:6/16/2021    ACE III MINI        Does the patient have evidence of cognitive impairment? {Yes/No w/ pre-defaulted No:21718::\"No\"}    Aspirin use counseling? {Aspirin :86915}      Recent Lab Results:  CMP:  Lab Results   Component Value Date    BUN 21 06/16/2021    CREATININE 0.97 06/16/2021    EGFRIFNONA 78 06/16/2021    BCR 21.6 06/16/2021     06/16/2021    K 4.7 06/16/2021    CO2 24.9 06/16/2021    CALCIUM 8.9 06/16/2021    ALBUMIN 4.60 06/16/2021    BILITOT 0.5 06/16/2021    ALKPHOS 75 06/16/2021    AST 19 06/16/2021    ALT 19 06/16/2021     HbA1c:  Lab Results   Component Value Date    HGBA1C 5.50 06/16/2021    HGBA1C 5.93 (H) 12/11/2019     Microalbumin:  Lab Results   Component Value Date    MICROALBUR <1.2 06/16/2021     Lipid Panel  Lab Results   Component Value Date    " "CHOL 173 06/16/2021    TRIG 165 (H) 06/16/2021    HDL 51 06/16/2021    LDL 94 06/16/2021    AST 19 06/16/2021    ALT 19 06/16/2021       Visual Acuity:  No exam data present    Age-appropriate Screening Schedule:  Refer to the list below for future screening recommendations based on patient's age, sex and/or medical conditions. Orders for these recommended tests are listed in the plan section. The patient has been provided with a written plan.    Health Maintenance   Topic Date Due   • ZOSTER VACCINE (1 of 2) Never done   • INFLUENZA VACCINE  08/01/2021   • LIPID PANEL  06/16/2022   • TDAP/TD VACCINES (2 - Td or Tdap) 04/21/2029        Subjective   History of Present Illness    Luis Felipe Siegel is a 66 y.o. male {new/est pt:[id number]} presenting for a Welcome to Medicare Visit.     CHRONIC CONDITIONS    The following portions of the patient's history were reviewed and updated as appropriate: {history reviewed:20406::\"allergies\",\"current medications\",\"past family history\",\"past medical history\",\"past social history\",\"past surgical history\",\"problem list\"}.    Outpatient Medications Prior to Visit   Medication Sig Dispense Refill   • amLODIPine (NORVASC) 5 MG tablet Take 1 tablet by mouth Daily. 30 tablet 2   • amLODIPine-valsartan (EXFORGE) 5-160 MG per tablet TAKE 1 TABLET BY MOUTH  DAILY 90 tablet 3   • Ventolin  (90 Base) MCG/ACT inhaler USE 2 INHALATIONS BY MOUTH  EVERY 4 TO 6 HOURS AS  NEEDED 54 g 2     No facility-administered medications prior to visit.       Patient Active Problem List   Diagnosis   • Benign prostatic hyperplasia without lower urinary tract symptoms   • Overweight (BMI 25.0-29.9)   • Bronchospasm   • Diverticulosis   • Internal hemorrhoids   • Routine medical exam   • Urinary retention   • Essential hypertension   • Mixed hyperlipidemia       Advance Care Planning:  {Advance Directive Status:32299}    Identification of Risk Factors:  Risk factors include: {MC; WELLNESS RISK " FACTORS:51180}.    Review of Systems    Compared to one year ago, the patient feels his physical health is {better worse same:40906}.  Compared to one year ago, the patient feels his mental health is {better worse same:27321}.    Objective    Physical Exam     There were no vitals filed for this visit.    Patient's There is no height or weight on file to calculate BMI. indicating that he is {weight categories:63622}.      Procedure   Procedures       Assessment/Plan   Problem List Items Addressed This Visit        Cardiac and Vasculature    Essential hypertension    Relevant Medications    amLODIPine-valsartan (EXFORGE) 5-160 MG per tablet    amLODIPine (NORVASC) 5 MG tablet    Mixed hyperlipidemia      Other Visit Diagnoses     Welcome to Medicare preventive visit    -  Primary        Patient Self-Management and Personalized Health Advice  The patient has been provided with information about: {; PERSONALIZED HEALTH ADVICE:74450} and preventive services including:   · {PLAN:14141}.    Outpatient Encounter Medications as of 12/17/2021   Medication Sig Dispense Refill   • amLODIPine (NORVASC) 5 MG tablet Take 1 tablet by mouth Daily. 30 tablet 2   • amLODIPine-valsartan (EXFORGE) 5-160 MG per tablet TAKE 1 TABLET BY MOUTH  DAILY 90 tablet 3   • Ventolin  (90 Base) MCG/ACT inhaler USE 2 INHALATIONS BY MOUTH  EVERY 4 TO 6 HOURS AS  NEEDED 54 g 2     No facility-administered encounter medications on file as of 12/17/2021.       Reviewed use of high risk medication in the elderly: {Response; yes/no/na:63}  Reviewed for potential of harmful drug interactions in the elderly: {Response; yes/no/na:63}    Follow Up:  No follow-ups on file.     There are no Patient Instructions on file for this visit.    An After Visit Summary and PPPS with all of these plans were given to the patient.

## 2021-12-17 NOTE — PROGRESS NOTES
The ABCs of the Annual Wellness Visit  Welcome to Medicare Visit    No chief complaint on file.    Subjective {   History of Present Illness:  Luis Felipe Siegel is a 66 y.o. male who presents for a  Welcome to Medicare Visit.    Luis Felipe has a pertinent medical history of hypertension, hyperlipidemia, and BPH.  Amlodipine was decreased to 5 mg at the end of October.    The following portions of the patient's history were reviewed and   updated as appropriate: allergies, current medications, past family history, past medical history, past social history, past surgical history and problem list.     Compared to one year ago, the patient feels his physical   health is {better worse same:38320}.    Compared to one year ago, the patient feels his mental   health is {better worse same:10760}.    Recent Hospitalizations:  He was not admitted to the hospital during the last year.       Current Medical Providers:  Patient Care Team:  Vi Solis APRN as PCP - General (Internal Medicine)  Provider, No Known as PCP - Family Medicine  Moe Santacruz MD as Consulting Physician (Urology)    Outpatient Medications Prior to Visit   Medication Sig Dispense Refill   • amLODIPine (NORVASC) 5 MG tablet Take 1 tablet by mouth Daily. 30 tablet 2   • amLODIPine-valsartan (EXFORGE) 5-160 MG per tablet TAKE 1 TABLET BY MOUTH  DAILY 90 tablet 3   • Ventolin  (90 Base) MCG/ACT inhaler USE 2 INHALATIONS BY MOUTH  EVERY 4 TO 6 HOURS AS  NEEDED 54 g 2     No facility-administered medications prior to visit.       No opioid medication identified on active medication list. I have reviewed chart for other potential  high risk medication/s and harmful drug interactions in the elderly.          Aspirin is not on active medication list.  Aspirin use is not indicated based on review of current medical condition/s. Risk of harm outweighs potential benefits.  .    Patient Active Problem List   Diagnosis   • Benign prostatic hyperplasia without  lower urinary tract symptoms   • Overweight (BMI 25.0-29.9)   • Bronchospasm   • Diverticulosis   • Internal hemorrhoids   • Routine medical exam   • Urinary retention   • Essential hypertension   • Mixed hyperlipidemia     Advance Care Planning  Advance Directive is not on file.  {ACP Discussion, Advance Directive not in EMR:52144}          Objective    There were no vitals filed for this visit.  There is no height or weight on file to calculate BMI.  BMI has not been calculated during today's encounter.     Does the patient have evidence of cognitive impairment? No    Physical Exam         Procedures       HEALTH RISK ASSESSMENT    Smoking Status:  Social History     Tobacco Use   Smoking Status Never Smoker   Smokeless Tobacco Never Used     Alcohol Consumption:  Social History     Substance and Sexual Activity   Alcohol Use Yes   • Alcohol/week: 7.0 - 14.0 standard drinks   • Types: 7 - 14 Cans of beer per week       Fall Risk Screen:    DANILO Fall Risk Assessment was completed, and patient is at LOW risk for falls.Assessment completed on:6/16/2021    Depression Screen:   PHQ-2/PHQ-9 Depression Screening 6/16/2021   Little interest or pleasure in doing things 0   Feeling down, depressed, or hopeless 0   Total Score 0       Health Habits and Functional and Cognitive Screening:  No flowsheet data found.    Visual Acuity:    No exam data present    Age-appropriate Screening Schedule:  Refer to the list below for future screening recommendations based on patient's age, sex and/or medical conditions. Orders for these recommended tests are listed in the plan section. The patient has been provided with a written plan.    Health Maintenance   Topic Date Due   • ZOSTER VACCINE (1 of 2) Never done   • INFLUENZA VACCINE  08/01/2021   • LIPID PANEL  06/16/2022   • TDAP/TD VACCINES (2 - Td or Tdap) 04/21/2029          Assessment/Plan   CMS Preventative Services Quick Reference  Risk Factors Identified During  Encounter  {Medicare Wellness Risk Factors:03408}  The above risks/problems have been discussed with the patient.  Pertinent information has been shared with the patient in the After Visit Summary.  Follow up plans and orders are seen below in the Assessment/Plan Section.    There are no diagnoses linked to this encounter.    Follow Up:   No follow-ups on file.     An After Visit Summary and PPPS were made available to the patient.    {Optional Chart Navigation Links Wrapup  Review (Popup)  Advance Care Planning  Labs  CC  Problem List  Visit Diagnosis  Medications  Result Review  Imaging  [facility]  Quality  BestPractice  SmartSets  SnapShot  Encounters  Notes  Media  Procedures :23}    {Time Spent-Use for E/M Coding (Optional):95448}

## 2021-12-17 NOTE — PROGRESS NOTES
Luis Felipe Siegel  1955  [id number]  Patient Care Team:  Vi Solis APRN as PCP - General (Internal Medicine)  Provider, No Known as PCP - Family Medicine  Moe Santacruz MD as Consulting Physician (Urology)    Luis Felipe Siegel is a 66 y.o. pleasant male here today for annual physical.  Chief Complaint   Patient presents with   • Annual Exam       HPI:   Luis Felipe is here for his routine physical.  He has a pertinent medical history of hypertension, hyperlipidemia and BPH.   in Aug changed amlodipine/valsartan back to amlodipine only since he was having some low bp.  However, at colonoscopy 12/3/21 BP was up 150-170s/70s.  He watched for a few days and was seeing mostly 140/70s and went back on the amlodipine/valsartan combo.  Still walking 1 mi a day on lunch break and sometimes more on weekends. No change in etoh intake, no particular stress.   No peripheral edema.    Past Medical History:   Diagnosis Date   • Asthma    • Bronchospasm      to Animal Dander   • Positive PPD     in 1983     Past Surgical History:   Procedure Laterality Date   • NO PAST SURGERIES       Family History   Problem Relation Age of Onset   • COPD Mother    • Cancer Mother         lung?   • Atrial fibrillation Father    • Hypertension Father    • No Known Problems Brother    • Hypertension Maternal Grandmother    • Hypertension Maternal Grandfather    • No Known Problems Paternal Grandmother    • Prostate cancer Paternal Grandfather      Social History     Tobacco Use   Smoking Status Never Smoker   Smokeless Tobacco Never Used     No Known Allergies    Current Outpatient Medications:   •  amLODIPine-valsartan (EXFORGE) 5-160 MG per tablet, TAKE 1 TABLET BY MOUTH  DAILY, Disp: 90 tablet, Rfl: 3  •  Ventolin  (90 Base) MCG/ACT inhaler, USE 2 INHALATIONS BY MOUTH  EVERY 4 TO 6 HOURS AS  NEEDED, Disp: 54 g, Rfl: 2    Review of Systems   Respiratory: Negative for shortness of breath.    Cardiovascular: Negative for chest pain.  "  Gastrointestinal: Negative for blood in stool, constipation, diarrhea and GERD.       /84 (BP Location: Left arm, Patient Position: Sitting, Cuff Size: Adult)   Pulse 80   Temp 98.4 °F (36.9 °C)   Ht 177.8 cm (70\")   Wt 93.6 kg (206 lb 6.4 oz)   BMI 29.62 kg/m²     Physical Exam  Vitals reviewed.   Constitutional:       Appearance: Normal appearance. He is well-developed.   HENT:      Head: Normocephalic.      Comments: *wearing mask     Right Ear: External ear normal.      Left Ear: External ear normal.   Eyes:      Conjunctiva/sclera: Conjunctivae normal.      Pupils: Pupils are equal, round, and reactive to light.   Cardiovascular:      Rate and Rhythm: Normal rate and regular rhythm.      Pulses: Normal pulses.      Heart sounds: Normal heart sounds.   Pulmonary:      Effort: Pulmonary effort is normal.      Breath sounds: Normal breath sounds.   Abdominal:      General: Abdomen is flat. Bowel sounds are normal.      Palpations: Abdomen is soft.   Musculoskeletal:         General: Normal range of motion.      Cervical back: Normal range of motion and neck supple.   Skin:     General: Skin is warm and dry.   Neurological:      Mental Status: He is alert and oriented to person, place, and time.   Psychiatric:         Mood and Affect: Mood normal.         Behavior: Behavior normal.         Thought Content: Thought content normal.         Judgment: Judgment normal.         Results Review:  None    PHQ-9 Total Score: 0          Assessment/Plan:  Diagnoses and all orders for this visit:    1. Essential hypertension (Primary)  Comments:  Continue amlodipine valsartan 5-160, daily walking, EtOH 2 drinks a day or less, low-sodium diet  Orders:  -     CBC & Differential; Future  -     Comprehensive Metabolic Panel; Future  -     Microalbumin / Creatinine Urine Ratio - Urine, Clean Catch; Future    2. Mixed hyperlipidemia  Comments:  Fasting labs today  Orders:  -     Lipid Panel; Future  -     TSH Rfx On " Abnormal To Free T4; Future    Other orders  -     Fluzone High-Dose 65+yrs         Patient Instructions   Preventive Care 65 Years and Older, Male  Preventive care refers to lifestyle choices and visits with your health care provider that can promote health and wellness. This includes:  · A yearly physical exam. This is also called an annual well check.  · Regular dental and eye exams.  · Immunizations.  · Screening for certain conditions.  · Healthy lifestyle choices, such as diet and exercise.  What can I expect for my preventive care visit?  Physical exam  Your health care provider will check:  · Height and weight. These may be used to calculate body mass index (BMI), which is a measurement that tells if you are at a healthy weight.  · Heart rate and blood pressure.  · Your skin for abnormal spots.  Counseling  Your health care provider may ask you questions about:  · Alcohol, tobacco, and drug use.  · Emotional well-being.  · Home and relationship well-being.  · Sexual activity.  · Eating habits.  · History of falls.  · Memory and ability to understand (cognition).  · Work and work environment.  What immunizations do I need?    Influenza (flu) vaccine  · This is recommended every year.  Tetanus, diphtheria, and pertussis (Tdap) vaccine  · You may need a Td booster every 10 years.  Varicella (chickenpox) vaccine  · You may need this vaccine if you have not already been vaccinated.  Zoster (shingles) vaccine  · You may need this after age 60.  Pneumococcal conjugate (PCV13) vaccine  · One dose is recommended after age 65.  Pneumococcal polysaccharide (PPSV23) vaccine  · One dose is recommended after age 65.  Measles, mumps, and rubella (MMR) vaccine  · You may need at least one dose of MMR if you were born in 1957 or later. You may also need a second dose.  Meningococcal conjugate (MenACWY) vaccine  · You may need this if you have certain conditions.  Hepatitis A vaccine  · You may need this if you have certain  conditions or if you travel or work in places where you may be exposed to hepatitis A.  Hepatitis B vaccine  · You may need this if you have certain conditions or if you travel or work in places where you may be exposed to hepatitis B.  Haemophilus influenzae type b (Hib) vaccine  · You may need this if you have certain conditions.  You may receive vaccines as individual doses or as more than one vaccine together in one shot (combination vaccines). Talk with your health care provider about the risks and benefits of combination vaccines.  What tests do I need?  Blood tests  · Lipid and cholesterol levels. These may be checked every 5 years, or more frequently depending on your overall health.  · Hepatitis C test.  · Hepatitis B test.  Screening  · Lung cancer screening. You may have this screening every year starting at age 55 if you have a 30-pack-year history of smoking and currently smoke or have quit within the past 15 years.  · Colorectal cancer screening. All adults should have this screening starting at age 50 and continuing until age 75. Your health care provider may recommend screening at age 45 if you are at increased risk. You will have tests every 1-10 years, depending on your results and the type of screening test.  · Prostate cancer screening. Recommendations will vary depending on your family history and other risks.  · Diabetes screening. This is done by checking your blood sugar (glucose) after you have not eaten for a while (fasting). You may have this done every 1-3 years.  · Abdominal aortic aneurysm (AAA) screening. You may need this if you are a current or former smoker.  · Sexually transmitted disease (STD) testing.  Follow these instructions at home:  Eating and drinking  · Eat a diet that includes fresh fruits and vegetables, whole grains, lean protein, and low-fat dairy products. Limit your intake of foods with high amounts of sugar, saturated fats, and salt.  · Take vitamin and mineral  supplements as recommended by your health care provider.  · Do not drink alcohol if your health care provider tells you not to drink.  · If you drink alcohol:  ? Limit how much you have to 0-2 drinks a day.  ? Be aware of how much alcohol is in your drink. In the [place] one drink equals one 12 oz bottle of beer (355 mL), one 5 oz glass of wine (148 mL), or one 1½ oz glass of hard liquor (44 mL).  Lifestyle  · Take daily care of your teeth and gums.  · Stay active. Exercise for at least 30 minutes on 5 or more days each week.  · Do not use any products that contain nicotine or tobacco, such as cigarettes, e-cigarettes, and chewing tobacco. If you need help quitting, ask your health care provider.  · If you are sexually active, practice safe sex. Use a condom or other form of protection to prevent STIs (sexually transmitted infections).  · Talk with your health care provider about taking a low-dose aspirin or statin.  What's next?  · Visit your health care provider once a year for a well check visit.  · Ask your health care provider how often you should have your eyes and teeth checked.  · Stay up to date on all vaccines.  This information is not intended to replace advice given to you by your health care provider. Make sure you discuss any questions you have with your health care provider.  Document Revised: 12/12/2019 Document Reviewed: 12/12/2019  Elsevier Patient Education © 2020 [employer]        Counseling/Anticipatory Guidance:   Plan of care reviewed with patient at the conclusion of today's visit. Education was provided in regards to diagnosis, diet and exercise, prostate cancer screening discussed including benefit of early detection, potential need for follow-up, and harms associated with additional management. Patient agrees to screening.    Nutrition, physical activity, healthy weight,ways to reduce stress, adequate sleep, injury prevention, misuse of tobacco, alcohol and drugs, sexual behavior and  STD's, dental health, mental health, and immunizations.    Plan of care reviewed with patient at the conclusion of today's visit. Education was provided regarding diagnosis, management and any prescribed or recommended OTC medications.  Patient verbalizes understanding of and agreement with management plan.    Return in about 6 months (around 6/17/2022) for Labs this visit.    Dictated Utilizing Dragon Dictation      TARI Leonardo

## 2021-12-22 ENCOUNTER — APPOINTMENT (OUTPATIENT)
Dept: GENERAL RADIOLOGY | Facility: HOSPITAL | Age: 66
End: 2021-12-22

## 2021-12-22 ENCOUNTER — HOSPITAL ENCOUNTER (EMERGENCY)
Facility: HOSPITAL | Age: 66
Discharge: HOME | End: 2021-12-22
Attending: EMERGENCY MEDICINE | Admitting: EMERGENCY MEDICINE

## 2021-12-22 VITALS
OXYGEN SATURATION: 95 % | HEIGHT: 70 IN | WEIGHT: 200 LBS | DIASTOLIC BLOOD PRESSURE: 60 MMHG | TEMPERATURE: 97.9 F | BODY MASS INDEX: 28.63 KG/M2 | SYSTOLIC BLOOD PRESSURE: 101 MMHG | HEART RATE: 72 BPM | RESPIRATION RATE: 18 BRPM

## 2021-12-22 DIAGNOSIS — I48.91 NEW ONSET ATRIAL FIBRILLATION (HCC): Primary | ICD-10-CM

## 2021-12-22 LAB
ALBUMIN SERPL-MCNC: 4.5 G/DL (ref 3.5–5.2)
ALBUMIN/GLOB SERPL: 1.5 G/DL
ALP SERPL-CCNC: 85 U/L (ref 39–117)
ALT SERPL W P-5'-P-CCNC: 17 U/L (ref 1–41)
ANION GAP SERPL CALCULATED.3IONS-SCNC: 12 MMOL/L (ref 5–15)
AST SERPL-CCNC: 19 U/L (ref 1–40)
BILIRUB SERPL-MCNC: 0.4 MG/DL (ref 0–1.2)
BUN SERPL-MCNC: 25 MG/DL (ref 8–23)
BUN/CREAT SERPL: 24 (ref 7–25)
CALCIUM SPEC-SCNC: 8.9 MG/DL (ref 8.6–10.5)
CHLORIDE SERPL-SCNC: 104 MMOL/L (ref 98–107)
CO2 SERPL-SCNC: 23 MMOL/L (ref 22–29)
CREAT SERPL-MCNC: 1.04 MG/DL (ref 0.76–1.27)
GFR SERPL CREATININE-BSD FRML MDRD: 72 ML/MIN/1.73
GLOBULIN UR ELPH-MCNC: 3.1 GM/DL
GLUCOSE SERPL-MCNC: 115 MG/DL (ref 65–99)
HOLD SPECIMEN: NORMAL
HOLD SPECIMEN: NORMAL
MAGNESIUM SERPL-MCNC: 2.1 MG/DL (ref 1.6–2.4)
NT-PROBNP SERPL-MCNC: 37.9 PG/ML (ref 0–900)
POTASSIUM SERPL-SCNC: 4.4 MMOL/L (ref 3.5–5.2)
PROT SERPL-MCNC: 7.6 G/DL (ref 6–8.5)
SODIUM SERPL-SCNC: 139 MMOL/L (ref 136–145)
TROPONIN T SERPL-MCNC: <0.01 NG/ML (ref 0–0.03)
TSH SERPL DL<=0.05 MIU/L-ACNC: 1.92 UIU/ML (ref 0.27–4.2)
WHOLE BLOOD HOLD SPECIMEN: NORMAL

## 2021-12-22 PROCEDURE — 99283 EMERGENCY DEPT VISIT LOW MDM: CPT

## 2021-12-22 PROCEDURE — 83735 ASSAY OF MAGNESIUM: CPT | Performed by: EMERGENCY MEDICINE

## 2021-12-22 PROCEDURE — 71045 X-RAY EXAM CHEST 1 VIEW: CPT

## 2021-12-22 PROCEDURE — 84443 ASSAY THYROID STIM HORMONE: CPT | Performed by: EMERGENCY MEDICINE

## 2021-12-22 PROCEDURE — 96374 THER/PROPH/DIAG INJ IV PUSH: CPT

## 2021-12-22 PROCEDURE — 92960 CARDIOVERSION ELECTRIC EXT: CPT

## 2021-12-22 PROCEDURE — 80053 COMPREHEN METABOLIC PANEL: CPT | Performed by: EMERGENCY MEDICINE

## 2021-12-22 PROCEDURE — 93005 ELECTROCARDIOGRAM TRACING: CPT

## 2021-12-22 PROCEDURE — 83880 ASSAY OF NATRIURETIC PEPTIDE: CPT | Performed by: EMERGENCY MEDICINE

## 2021-12-22 PROCEDURE — 99152 MOD SED SAME PHYS/QHP 5/>YRS: CPT

## 2021-12-22 PROCEDURE — 84484 ASSAY OF TROPONIN QUANT: CPT | Performed by: EMERGENCY MEDICINE

## 2021-12-22 PROCEDURE — 93005 ELECTROCARDIOGRAM TRACING: CPT | Performed by: EMERGENCY MEDICINE

## 2021-12-22 RX ORDER — SODIUM CHLORIDE 0.9 % (FLUSH) 0.9 %
10 SYRINGE (ML) INJECTION AS NEEDED
Status: DISCONTINUED | OUTPATIENT
Start: 2021-12-22 | End: 2021-12-22 | Stop reason: HOSPADM

## 2021-12-22 RX ORDER — DILTIAZEM HYDROCHLORIDE 5 MG/ML
20 INJECTION INTRAVENOUS ONCE
Status: COMPLETED | OUTPATIENT
Start: 2021-12-22 | End: 2021-12-22

## 2021-12-22 RX ORDER — VALSARTAN 160 MG/1
160 TABLET ORAL DAILY
COMMUNITY
End: 2021-12-29 | Stop reason: ALTCHOICE

## 2021-12-22 RX ORDER — APIXABAN 5 MG (74)
5 KIT ORAL TAKE AS DIRECTED
Qty: 74 TABLET | Refills: 0 | Status: SHIPPED | OUTPATIENT
Start: 2021-12-22 | End: 2021-12-29

## 2021-12-22 RX ADMIN — METHOHEXITAL SODIUM 40 MG: 500 INJECTION, POWDER, LYOPHILIZED, FOR SOLUTION INTRAMUSCULAR; INTRAVENOUS; RECTAL at 16:04

## 2021-12-22 RX ADMIN — METHOHEXITAL SODIUM 20 MG: 500 INJECTION, POWDER, LYOPHILIZED, FOR SOLUTION INTRAMUSCULAR; INTRAVENOUS; RECTAL at 16:07

## 2021-12-22 RX ADMIN — DILTIAZEM HYDROCHLORIDE 20 MG: 5 INJECTION INTRAVENOUS at 15:06

## 2021-12-22 RX ADMIN — METHOHEXITAL SODIUM 20 MG: 500 INJECTION, POWDER, LYOPHILIZED, FOR SOLUTION INTRAMUSCULAR; INTRAVENOUS; RECTAL at 16:06

## 2021-12-22 NOTE — ED PROVIDER NOTES
Subjective   66-year-old male presents emergency department today with atrial fibrillation.  He states that we took his dog for a walk earlier this date when he came home noticed his heart was still racing.  He took a stethoscope and listened and not felt like he was in atrial fibrillation.  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  He has no prior history of atrial fibrillation.  He states he does not have any thyroid disease.  He has not been take any type of stimulants including caffeine.  He denies any chest pain associated with this.  States he did not feel short of breath he just has the palpitations.  No known cardiac disease.  He has not recently been ill.      History provided by:  Patient   used: No    Atrial Fibrillation  Location:  Palpitations irregular rhythm and rate  Severity:  Severe  Onset quality:  Sudden  Duration:  2 hours  Timing:  Constant  Progression:  Unchanged  Chronicity:  New  Context:  No known history  Relieved by:  Nothing  Worsened by:  Exertion  Associated symptoms: no abdominal pain, no chest pain, no congestion, no cough, no ear pain, no headaches, no loss of consciousness, no nausea, no shortness of breath, no sore throat, no vomiting and no wheezing        Review of Systems   Constitutional: Negative for chills.   HENT: Negative for congestion, ear pain and sore throat.    Respiratory: Negative for cough, chest tightness, shortness of breath and wheezing.    Cardiovascular: Positive for palpitations. Negative for chest pain.   Gastrointestinal: Negative for abdominal pain, nausea and vomiting.   Neurological: Negative for loss of consciousness and headaches.   Psychiatric/Behavioral: Negative.    All other systems reviewed and are negative.      Past Medical History:   Diagnosis Date   • Asthma    • Bronchospasm      to Animal Dander   • Hyperlipidemia    • Positive PPD     in 1983   •  Ureteral disorder        No Known Allergies    Past Surgical History:   Procedure Laterality Date   • NO PAST SURGERIES         Family History   Problem Relation Age of Onset   • COPD Mother    • Cancer Mother         lung?   • Atrial fibrillation Father    • Hypertension Father    • No Known Problems Brother    • Hypertension Maternal Grandmother    • Hypertension Maternal Grandfather    • No Known Problems Paternal Grandmother    • Prostate cancer Paternal Grandfather        Social History     Socioeconomic History   • Marital status:    Tobacco Use   • Smoking status: Never Smoker   • Smokeless tobacco: Never Used   Substance and Sexual Activity   • Alcohol use: Yes     Alcohol/week: 7.0 - 14.0 standard drinks     Types: 7 - 14 Cans of beer per week   • Drug use: Never   • Sexual activity: Defer           Objective   Physical Exam  Vitals and nursing note reviewed.   Constitutional:       Appearance: He is well-developed.   HENT:      Head: Normocephalic and atraumatic.      Right Ear: External ear normal.      Left Ear: External ear normal.      Nose: Nose normal.   Eyes:      General: No scleral icterus.     Conjunctiva/sclera: Conjunctivae normal.      Pupils: Pupils are equal, round, and reactive to light.   Neck:      Thyroid: No thyromegaly.   Cardiovascular:      Heart sounds: Normal heart sounds.      Comments: Irregularly irregular tachycardic  Pulmonary:      Effort: Pulmonary effort is normal. No respiratory distress.      Breath sounds: Normal breath sounds. No wheezing or rales.   Chest:      Chest wall: No tenderness.   Abdominal:      General: Bowel sounds are normal. There is no distension.      Palpations: Abdomen is soft.      Tenderness: There is no abdominal tenderness.   Musculoskeletal:         General: Normal range of motion.      Cervical back: Normal range of motion.   Lymphadenopathy:      Cervical: No cervical adenopathy.   Skin:     General: Skin is warm and dry.   Neurological:       Mental Status: He is alert and oriented to person, place, and time.      Cranial Nerves: No cranial nerve deficit.      Coordination: Coordination normal.      Deep Tendon Reflexes: Reflexes are normal and symmetric. Reflexes normal.   Psychiatric:         Behavior: Behavior normal.         Thought Content: Thought content normal.         Judgment: Judgment normal.         Procedures           ED Course  ED Course as of 12/23/21 2108   Wed Dec 22, 2021   1620 I discussed Mr. Siegel's care with SHAYNE Armstrong.  I reviewed his records.  We have given Cardizem 20 mg IV with no change in rhythm and very little change in heart rate.  On exam he tells me he is certain that his heart rate was in the 60s and 70s yesterday.  Today it is in the 130s.  I discussed risks and benefits of procedural sedation and cardioversion and he wished to proceed.  I cardioverted him successfully.  I have paged Dr. Betancur who is on-call for cardiology to discuss. [DT]   1626 D/w Dr Betancur who asks that we prescribe as needed metoprolol, start him on Eliquis, and have him follow-up in the atrial fibrillation clinic.  I discussed these recommendations with him. [DT]      ED Course User Index  [DT] Zachery Lo MD                                         No results found for this or any previous visit (from the past 24 hour(s)).  Note: In addition to lab results from this visit, the labs listed above may include labs taken at another facility or during a different encounter within the last 24 hours. Please correlate lab times with ED admission and discharge times for further clarification of the services performed during this visit.    XR Chest 1 View   Final Result   No acute cardiopulmonary disease.       DICTATED:   12/22/2021   EDITED/lfs:   12/22/2021       This report was finalized on 12/22/2021 5:05 PM by Dr. Radha Davila MD.            Vitals:    12/22/21 1609 12/22/21 1611 12/22/21 1629 12/22/21 1659   BP: 131/93 131/93  127/80 101/60   BP Location:  Right arm     Patient Position:  Lying     Pulse: (!) 154 78 71 72   Resp:  18     Temp:       TempSrc:       SpO2: 98% 98% 94% 95%   Weight:       Height:         Medications   dilTIAZem (CARDIZEM) injection 20 mg (20 mg Intravenous Given 12/22/21 1506)   methohexital (BREVITAL SODIUM) injection (20 mg Intravenous Given 12/22/21 1607)     ECG/EMG Results (last 24 hours)     Procedure Component Value Units Date/Time    ECG 12 Lead [[id number]] Collected: 12/22/21 1415     Updated: 12/22/21 1612        ECG 12 Lead         ECG 12 Lead    (Results Pending)               MDM  Number of Diagnoses or Management Options  New onset atrial fibrillation (HCC): new and requires workup     Amount and/or Complexity of Data Reviewed  Clinical lab tests: reviewed and ordered  Tests in the radiology section of CPT®: ordered and reviewed  Tests in the medicine section of CPT®: reviewed and ordered  Discuss the patient with other providers: yes    Patient Progress  Patient progress: stable      Final diagnoses:   New onset atrial fibrillation (HCC)       ED Disposition  ED Disposition     ED Disposition Condition Comment    Discharge Stable Patient discharged to home. Patient paperwork completed, copies given, vitals obtained, IV removed. Patient in no obvious distress at time of discharge.          [facility] CARDIOLOGY  [address]  [facility] [id number] [phone]        Vi Solis, TARI  [address]  [place] [id number]  [phone]               Medication List      New Prescriptions    Eliquis DVT/PE Starter Pack tablet therapy pack  Generic drug: Apixaban Starter Pack  Take two 5 mg tablets by mouth every 12 hours for 7 days. Followed by one 5 mg tablet every 12 hours. (Dispense starter pack if available)     metoprolol tartrate 25 MG tablet  Commonly known as: LOPRESSOR  As needed for atrial fibrillation           Where to  Get Your Medications      These medications were sent to [facility] - [place], [place] - [address] - [phone]  - [phone] FX  [address] [place] [address]    Phone: [phone]   · Eliquis DVT/PE Starter Pack tablet therapy pack  · metoprolol tartrate 25 MG tablet          Tim Armstrong PA  12/23/21 9061

## 2021-12-22 NOTE — ED PROVIDER NOTES
Subjective   This note is to document the procedures of sedation and cardioversion.  For complete history and physical please refer to SHAYNE Armstrong's note.          Review of Systems    Past Medical History:   Diagnosis Date   • Asthma    • Bronchospasm      to Animal Dander   • Hyperlipidemia    • Positive PPD     in 1983   • Ureteral disorder        No Known Allergies    Past Surgical History:   Procedure Laterality Date   • NO PAST SURGERIES         Family History   Problem Relation Age of Onset   • COPD Mother    • Cancer Mother         lung?   • Atrial fibrillation Father    • Hypertension Father    • No Known Problems Brother    • Hypertension Maternal Grandmother    • Hypertension Maternal Grandfather    • No Known Problems Paternal Grandmother    • Prostate cancer Paternal Grandfather        Social History     Socioeconomic History   • Marital status:    Tobacco Use   • Smoking status: Never Smoker   • Smokeless tobacco: Never Used   Substance and Sexual Activity   • Alcohol use: Yes     Alcohol/week: 7.0 - 14.0 standard drinks     Types: 7 - 14 Cans of beer per week   • Drug use: Never   • Sexual activity: Defer           Objective   Physical Exam    Electrical Cardioversion    Date/Time: 12/22/2021 4:18 PM  Performed by: Zachery Lo MD  Authorized by: Zachery Lo MD     Consent:     Consent obtained:  Verbal    Consent given by:  Patient    Risks discussed:  Induced arrhythmia  Pre-procedure details:     Cardioversion basis:  Elective    Rhythm:  Atrial fibrillation    Electrode placement:  Anterior-posterior  Attempt one:     Cardioversion mode:  Synchronous    Waveform:  Biphasic    Shock (Joules):  120    Shock outcome:  Conversion to normal sinus rhythm  Post-procedure details:     Patient status:  Alert    Patient tolerance of procedure:  Tolerated well, no immediate complications  Procedural Sedation    Date/Time: 12/22/2021 4:19 PM  Performed by: Zachery Lo MD  Authorized  by: Zachery Lo MD     Consent:     Consent obtained:  Verbal    Consent given by:  Patient    Risks discussed:  Respiratory compromise necessitating ventilatory assistance and intubation  Indications:     Procedure performed:  Cardioversion    Procedure necessitating sedation performed by:  Physician performing sedation    Intended level of sedation:  Deep  Pre-sedation assessment:     ASA classification: class 2 - patient with mild systemic disease      Neck mobility: normal      Mouth opening:  3 or more finger widths    Mallampati score:  I - soft palate, uvula, fauces, pillars visible    Pre-sedation assessments completed and reviewed: airway patency    Immediate pre-procedure details:     Reviewed: vital signs      Verified: bag valve mask available and intubation equipment available    Procedure details (see MAR for exact dosages):     Preoxygenation:  Nonrebreather mask    Intra-procedure events: none      Total sedation time (minutes):  15  Post-procedure details:     Complications:  None    Patient tolerance:  Tolerated well, no immediate complications               ED Course  ED Course as of 12/22/21 1634   Wed Dec 22, 2021   1620 I discussed Mr. Siegel's care with SHAYNE Armstrong.  I reviewed his records.  We have given Cardizem 20 mg IV with no change in rhythm and very little change in heart rate.  On exam he tells me he is certain that his heart rate was in the 60s and 70s yesterday.  Today it is in the 130s.  I discussed risks and benefits of procedural sedation and cardioversion and he wished to proceed.  I cardioverted him successfully.  I have paged Dr. Betancur who is on-call for cardiology to discuss. [DT]   1626 D/w Dr Betancur who asks that we prescribe as needed metoprolol, start him on Eliquis, and have him follow-up in the atrial fibrillation clinic.  I discussed these recommendations with him. [DT]      ED Course User Index  [DT] Zachery Lo MD                                                  MDM    Final diagnoses:   New onset atrial fibrillation (HCC)       ED Disposition  ED Disposition     None          No follow-up provider specified.       Medication List      No changes were made to your prescriptions during this visit.          Zachery Lo MD  12/22/21 2431

## 2021-12-28 LAB
QT INTERVAL: 282 MS
QTC INTERVAL: 445 MS

## 2021-12-28 NOTE — PROGRESS NOTES
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  Patient presented to the ED on 12/22 with new onset atrial fibrillation with RVR.  He underwent successful cardioversion in the ED and was started on Eliquis and metoprolol as needed. He reports that he has been exercising more often. On Wednesday he did his normal 1 mile walk and after he sat on his computer to do some work and felt palpitations. He felt his pulse and knew immediately he was in A. fib so he presented to the ED. He reports that he was relatively asymptomatic, aside from the palpitations. He denies any shortness of breath or chest pain. He denies any exertional symptoms. He reports some labile control of his blood pressures and recently had a change his amlodipine to Exforge. Most of his systolic blood pressures run around 130s now. He recently got an apple watch and his heart rates have been stable around 60 to 80s. No alerts for A. fib    Drinks only decaf coffee. Drinks 1-2 alcoholic drinks a day. No history of sleep apnea, but his wife does tell him that he snores     Cardiac risks: HTN, age, gender    Objective     Vital Signs:   Vitals:    12/29/21 0815   BP: 136/76   BP Location: Left arm   Patient Position: Sitting   Cuff Size: Adult   Pulse: 75   Resp: 18   Temp: 97.4 °F (36.3 °C)   TempSrc: Temporal   SpO2: 100%   Weight: 95.4 kg (210 lb 4 oz)   Height: 177.8 cm (70\")     Body mass index is 30.17 kg/m².  Physical Exam  Vitals reviewed.   Constitutional:       Appearance: Normal appearance.   HENT:      Head: " Normocephalic.   Neck:      Vascular: No carotid bruit.   Cardiovascular:      Rate and Rhythm: Normal rate and regular rhythm.      Pulses: Normal pulses.      Heart sounds: Normal heart sounds, S1 normal and S2 normal. No murmur heard.      Pulmonary:      Effort: Pulmonary effort is normal. No respiratory distress.      Breath sounds: Normal breath sounds.   Chest:      Chest wall: No tenderness.   Abdominal:      General: Abdomen is flat.      Palpations: Abdomen is soft.   Musculoskeletal:      Cervical back: Neck supple.      Right lower leg: No edema.      Left lower leg: No edema.   Skin:     General: Skin is warm and dry.   Neurological:      General: No focal deficit present.      Mental Status: He is alert and oriented to person, place, and time. Mental status is at baseline.   Psychiatric:         Mood and Affect: Mood normal.         Behavior: Behavior normal.         Thought Content: Thought content normal.              Result Review  Data Reviewed:{ Labs  Result Review  Imaging  Med Tab  Media :23}   BNP (12/22/2021 14:27)  TSH (12/22/2021 14:27)  Troponin (12/22/2021 14:27)  Magnesium (12/22/2021 14:27)  Comprehensive Metabolic Panel (12/22/2021 14:27)  TSH Rfx On Abnormal To Free T4 (12/17/2021 09:00)  Lipid Panel (12/17/2021 09:00)  XR Chest 1 View (12/22/2021 14:54)  ECG 12 Lead (12/22/2021 14:15)  ECG 12 Lead (12/22/2021 14:09)    Consultant notes Tim BELLA            Assessment and Plan {CC Problem List  Visit Diagnosis  ROS  Review (Popup)  Health Maintenance  Quality  BestPractice  Medications  SmartSets  SnapShot Encounters  Media :23}   1. Paroxysmal atrial fibrillation (HCC)  CHADS-VASc Risk Assessment            2 Total Score    1 Hypertension    1 Age 65-74        Criteria that do not apply:    CHF    Age >/= 75    DM    PRIOR STROKE/TIA/THROMBO    Vascular Disease    Sex: Female        Atrial fibrillation education provided today including: causes of afib,  s/s, risks for stroke and use of anticoagulation for stroke prevention and treatment of atrial fibrillation.   Continue eliquis  His resting heart rates are mostly in the 60s to 70s and therefore we'll keep metoprolol as as needed. If he has recurrent episodes and testing is within normal limits he would be a good candidate for till in the pocket therapy with flecainide or propafenone with metoprolol    - Adult Transthoracic Echo Complete W/ Cont if Necessary Per Protocol; Future  - Stress Test With Myocardial Perfusion (1 Day); Future  - Ambulatory Referral to Sleep Medicine  - Ambulatory Referral to Cardiology    2. Hypertension, unspecified type  Appears to be mostly controlled  Continue Exforge  Continue to monitor  - Adult Transthoracic Echo Complete W/ Cont if Necessary Per Protocol; Future  - Stress Test With Myocardial Perfusion (1 Day); Future  - Ambulatory Referral to Cardiology    3. Abnormal EKG    - Adult Transthoracic Echo Complete W/ Cont if Necessary Per Protocol; Future  - Stress Test With Myocardial Perfusion (1 Day); Future    4. Sleep disturbance  - Ambulatory Referral to Sleep Medicine        Follow Up {Instructions Charge Capture  Follow-up Communications :23}   No follow-ups on file.    Patient was given instructions and counseling regarding his condition or for health maintenance advice. Please see specific information pulled into the AVS if appropriate.  Advised to call the Heart and Valve Center with any questions, concerns, or worsening symptoms.    Dictated Utilizing Dragon Dictation

## 2021-12-29 ENCOUNTER — OFFICE VISIT (OUTPATIENT)
Dept: CARDIOLOGY | Facility: HOSPITAL | Age: 66
End: 2021-12-29

## 2021-12-29 VITALS
WEIGHT: 210.25 LBS | DIASTOLIC BLOOD PRESSURE: 76 MMHG | OXYGEN SATURATION: 100 % | RESPIRATION RATE: 18 BRPM | HEIGHT: 70 IN | SYSTOLIC BLOOD PRESSURE: 136 MMHG | TEMPERATURE: 97.4 F | HEART RATE: 75 BPM | BODY MASS INDEX: 30.1 KG/M2

## 2021-12-29 DIAGNOSIS — I48.0 PAROXYSMAL ATRIAL FIBRILLATION (HCC): Primary | ICD-10-CM

## 2021-12-29 DIAGNOSIS — I10 HYPERTENSION, UNSPECIFIED TYPE: ICD-10-CM

## 2021-12-29 DIAGNOSIS — G47.9 SLEEP DISTURBANCE: ICD-10-CM

## 2021-12-29 DIAGNOSIS — R94.31 ABNORMAL EKG: ICD-10-CM

## 2021-12-29 PROCEDURE — 99214 OFFICE O/P EST MOD 30 MIN: CPT | Performed by: NURSE PRACTITIONER

## 2022-01-13 ENCOUNTER — TELEMEDICINE (OUTPATIENT)
Dept: SLEEP MEDICINE | Facility: HOSPITAL | Age: 67
End: 2022-01-13

## 2022-01-13 ENCOUNTER — PATIENT MESSAGE (OUTPATIENT)
Dept: INTERNAL MEDICINE | Facility: CLINIC | Age: 67
End: 2022-01-13

## 2022-01-13 DIAGNOSIS — E66.3 OVERWEIGHT: ICD-10-CM

## 2022-01-13 DIAGNOSIS — G47.33 OBSTRUCTIVE SLEEP APNEA, ADULT: ICD-10-CM

## 2022-01-13 DIAGNOSIS — I48.91 ATRIAL FIBRILLATION, UNSPECIFIED TYPE: ICD-10-CM

## 2022-01-13 DIAGNOSIS — R06.83 SNORING: Primary | ICD-10-CM

## 2022-01-13 PROCEDURE — 99202 OFFICE O/P NEW SF 15 MIN: CPT | Performed by: INTERNAL MEDICINE

## 2022-01-14 RX ORDER — VALSARTAN 80 MG/1
80 TABLET ORAL DAILY
Qty: 90 TABLET | Refills: 0 | Status: SHIPPED | OUTPATIENT
Start: 2022-01-14 | End: 2022-02-01 | Stop reason: SDUPTHER

## 2022-01-14 RX ORDER — AMLODIPINE BESYLATE 5 MG/1
5 TABLET ORAL DAILY
Qty: 90 TABLET | Refills: 0 | Status: SHIPPED | OUTPATIENT
Start: 2022-01-14 | End: 2022-02-01 | Stop reason: SDUPTHER

## 2022-01-30 NOTE — PROGRESS NOTES
Chief Complaint  Snoring and possible sleep disordered breathing.    Subjective         Luis Felipe Siegel presents to [facility] for the evaluation of snoring and possible sleep disordered breathing.  He is referred by TARI Villalba.  He is also seen by TARI Garcia.  You have chosen to receive care through a telehealth visit.  Do you consent to use a video/audio connection for your medical care today? Yes  History of Present Illness  Patient had a history of atrial fibrillation in December.  He is referred for evaluation of possible sleep disordered breathing as a contributing factor.  He says he has had snoring noted for at least 5 years and apneas noted occasionally.  For about a year.  He denies any recent changes in his weight.  He generally feels rested on arising in the morning.  He denies morning headaches.    He denies waking with a dry mouth.  He denies breaking his nose.  He denies having trouble breathing his nose.  He denies falling asleep sitting quietly during the day.  He denies kicking or jerking his legs at night.  He denies having chronic pain that keeps him awake.  Says he previously done a pulse oximetry study and was told that it was abnormal.    He goes to bed about 9 PM.  He falls asleep in 30 minutes.  He awakens twice during the night.  He thinks he gets 9 hours of sleep considered generally feels rested.  He has had hypertension known for 2 years.  He denies any history of diabetes.  He has a history of atrial fibrillation.    Allergies: He is allergic to dust and cat dander    Habits: Smoking: Without    Ethanol: He has 1-2 drinks daily    Caffeine: He has less than 1 cup of decaf coffee per day    Medical illnesses: He has a history of hypertension and atrial fibrillation    Medications: Amlodipine, valsartan, Eliquis.    Surgeries: Without    Family history: Positives include hypertension, COPD, cancer.    Review of systems: Positives  include environmental allergies.  Other systems reviewed and negative.    Hall score is 2/24  Objective   Vital Signs:   There were no vitals taken for this visit.    Physical Exam patient appears to be awake and alert.  He does not appear to be in acute respiratory distress.  His stated weight is 205 pounds.  His height 70 inches.  Body mass index is 28.  He has Mallampati class III anatomy.  Result Review :         Assessment and Plan   Diagnoses and all orders for this visit:    1. Snoring (Primary)  -     Home Sleep Study; Future    2. Obstructive sleep apnea, adult  -     Home Sleep Study; Future    3. Overweight    4. Atrial fibrillation, unspecified type (HCC)    Patient has a history of snoring and occasional apneas.  He has been noticed to have atrial fibrillation.  He gives an excellent story for obstructive sleep apnea.  We will plan to proceed to home sleep testing.  We have discussed potential therapies including CPAP, weight control, oral appliance, and surgery.  We have discussed the long-term consequences of untreated obstructive sleep apnea.  These include hypertension, diabetes, heart disease, stroke, and dementia.  He is encouraged to achieve ideal body weight.  He is encouraged to avoid alcohol and sedatives close to bedtime.  He is encouraged to practice lateral position sleep.  We will plan to see him back after his study.  I spent 25 minutes caring for Luis Felipe on this date of service. This time includes time spent by me in the following activities:obtaining and/or reviewing a separately obtained history, performing a medically appropriate examination and/or evaluation , counseling and educating the patient/family/caregiver, ordering medications, tests, or procedures and documenting information in the medical record  Follow Up   Return for Follow up after study, Next scheduled follow-up, Video visit.  Patient was given instructions and counseling regarding his condition or for health  maintenance advice. Please see specific information pulled into the AVS if appropriate.   Tim Tucker MD [facility]  Sleep Medicine  Pulmonary and Critical Care Medicine

## 2022-02-01 ENCOUNTER — PATIENT MESSAGE (OUTPATIENT)
Dept: INTERNAL MEDICINE | Facility: CLINIC | Age: 67
End: 2022-02-01

## 2022-02-01 RX ORDER — VALSARTAN 80 MG/1
80 TABLET ORAL DAILY
Qty: 90 TABLET | Refills: 1 | Status: SHIPPED | OUTPATIENT
Start: 2022-02-01 | End: 2022-06-17 | Stop reason: HOSPADM

## 2022-02-01 RX ORDER — AMLODIPINE BESYLATE 5 MG/1
5 TABLET ORAL DAILY
Qty: 90 TABLET | Refills: 1 | Status: SHIPPED | OUTPATIENT
Start: 2022-02-01 | End: 2022-06-17 | Stop reason: ALTCHOICE

## 2022-02-08 ENCOUNTER — HOSPITAL ENCOUNTER (OUTPATIENT)
Dept: CARDIOLOGY | Facility: HOSPITAL | Age: 67
Discharge: HOME | End: 2022-02-08

## 2022-02-08 VITALS
BODY MASS INDEX: 30.06 KG/M2 | HEIGHT: 70 IN | WEIGHT: 210 LBS | HEART RATE: 77 BPM | DIASTOLIC BLOOD PRESSURE: 82 MMHG | SYSTOLIC BLOOD PRESSURE: 130 MMHG

## 2022-02-08 DIAGNOSIS — I48.0 PAROXYSMAL ATRIAL FIBRILLATION: ICD-10-CM

## 2022-02-08 DIAGNOSIS — I10 HYPERTENSION, UNSPECIFIED TYPE: ICD-10-CM

## 2022-02-08 DIAGNOSIS — R94.31 ABNORMAL EKG: ICD-10-CM

## 2022-02-08 LAB
BH CV ECHO MEAS - AO MAX PG (FULL): 5.3 MMHG
BH CV ECHO MEAS - AO MAX PG: 10 MMHG
BH CV ECHO MEAS - AO MEAN PG (FULL): 3.7 MMHG
BH CV ECHO MEAS - AO MEAN PG: 6.1 MMHG
BH CV ECHO MEAS - AO ROOT AREA (BSA CORRECTED): 1.5
BH CV ECHO MEAS - AO ROOT AREA: 7.9 CM^2
BH CV ECHO MEAS - AO ROOT DIAM: 3.2 CM
BH CV ECHO MEAS - AO V2 MAX: 155.4 CM/SEC
BH CV ECHO MEAS - AO V2 MEAN: 119.9 CM/SEC
BH CV ECHO MEAS - AO V2 VTI: 35.9 CM
BH CV ECHO MEAS - ASC AORTA: 3.6 CM
BH CV ECHO MEAS - AVA(I,A): 1.8 CM^2
BH CV ECHO MEAS - AVA(I,D): 2 CM^2
BH CV ECHO MEAS - AVA(V,A): 2.1 CM^2
BH CV ECHO MEAS - AVA(V,D): 2.1 CM^2
BH CV ECHO MEAS - BSA(HAYCOCK): 2.2 M^2
BH CV ECHO MEAS - BSA: 2.1 M^2
BH CV ECHO MEAS - BZI_BMI: 30.1 KILOGRAMS/M^2
BH CV ECHO MEAS - BZI_METRIC_HEIGHT: 177.8 CM
BH CV ECHO MEAS - BZI_METRIC_WEIGHT: 95.3 KG
BH CV ECHO MEAS - EDV(CUBED): 127.9 ML
BH CV ECHO MEAS - EDV(MOD-SP2): 136 ML
BH CV ECHO MEAS - EDV(MOD-SP4): 105 ML
BH CV ECHO MEAS - EDV(TEICH): 120.4 ML
BH CV ECHO MEAS - EF(CUBED): 72.9 %
BH CV ECHO MEAS - EF(MOD-BP): 59.9 %
BH CV ECHO MEAS - EF(MOD-SP2): 62.2 %
BH CV ECHO MEAS - EF(MOD-SP4): 59.2 %
BH CV ECHO MEAS - EF(TEICH): 64.4 %
BH CV ECHO MEAS - ESV(CUBED): 34.6 ML
BH CV ECHO MEAS - ESV(MOD-SP2): 51.4 ML
BH CV ECHO MEAS - ESV(MOD-SP4): 42.8 ML
BH CV ECHO MEAS - ESV(TEICH): 42.8 ML
BH CV ECHO MEAS - FS: 35.3 %
BH CV ECHO MEAS - IVS/LVPW: 0.85
BH CV ECHO MEAS - IVSD: 1 CM
BH CV ECHO MEAS - LA DIMENSION: 4.2 CM
BH CV ECHO MEAS - LA/AO: 1.3
BH CV ECHO MEAS - LAD MAJOR: 4.3 CM
BH CV ECHO MEAS - LAT PEAK E' VEL: 8.4 CM/SEC
BH CV ECHO MEAS - LATERAL E/E' RATIO: 10.3
BH CV ECHO MEAS - LV DIASTOLIC VOL/BSA (35-75): 49.3 ML/M^2
BH CV ECHO MEAS - LV IVRT: 0.1 SEC
BH CV ECHO MEAS - LV MASS(C)D: 205 GRAMS
BH CV ECHO MEAS - LV MASS(C)DI: 96.2 GRAMS/M^2
BH CV ECHO MEAS - LV MAX PG: 4.7 MMHG
BH CV ECHO MEAS - LV MEAN PG: 2.4 MMHG
BH CV ECHO MEAS - LV SYSTOLIC VOL/BSA (12-30): 20.1 ML/M^2
BH CV ECHO MEAS - LV V1 MAX: 108.7 CM/SEC
BH CV ECHO MEAS - LV V1 MEAN: 73.9 CM/SEC
BH CV ECHO MEAS - LV V1 VTI: 21.6 CM
BH CV ECHO MEAS - LVIDD: 5 CM
BH CV ECHO MEAS - LVIDS: 3.3 CM
BH CV ECHO MEAS - LVLD AP2: 7.8 CM
BH CV ECHO MEAS - LVLD AP4: 8.2 CM
BH CV ECHO MEAS - LVLS AP2: 5.9 CM
BH CV ECHO MEAS - LVLS AP4: 6.3 CM
BH CV ECHO MEAS - LVOT AREA (M): 3.1 CM^2
BH CV ECHO MEAS - LVOT AREA: 3 CM^2
BH CV ECHO MEAS - LVOT DIAM: 2 CM
BH CV ECHO MEAS - LVPWD: 1.2 CM
BH CV ECHO MEAS - MED PEAK E' VEL: 8.6 CM/SEC
BH CV ECHO MEAS - MEDIAL E/E' RATIO: 10.1
BH CV ECHO MEAS - MV A MAX VEL: 82.6 CM/SEC
BH CV ECHO MEAS - MV DEC TIME: 0.17 SEC
BH CV ECHO MEAS - MV E MAX VEL: 86.4 CM/SEC
BH CV ECHO MEAS - MV E/A: 1
BH CV ECHO MEAS - MV MAX PG: 5.2 MMHG
BH CV ECHO MEAS - MV MEAN PG: 2.5 MMHG
BH CV ECHO MEAS - MV V2 MAX: 113.9 CM/SEC
BH CV ECHO MEAS - MV V2 MEAN: 73.1 CM/SEC
BH CV ECHO MEAS - MV V2 VTI: 36.3 CM
BH CV ECHO MEAS - MVA(VTI): 1.8 CM^2
BH CV ECHO MEAS - PA ACC TIME: 0.13 SEC
BH CV ECHO MEAS - PA MAX PG: 4.9 MMHG
BH CV ECHO MEAS - PA PR(ACCEL): 22 MMHG
BH CV ECHO MEAS - PA V2 MAX: 110.1 CM/SEC
BH CV ECHO MEAS - RAP SYSTOLE: 3 MMHG
BH CV ECHO MEAS - RVSP: 19 MMHG
BH CV ECHO MEAS - SI(AO): 133.2 ML/M^2
BH CV ECHO MEAS - SI(CUBED): 43.8 ML/M^2
BH CV ECHO MEAS - SI(LVOT): 30.8 ML/M^2
BH CV ECHO MEAS - SI(MOD-SP2): 39.7 ML/M^2
BH CV ECHO MEAS - SI(MOD-SP4): 29.2 ML/M^2
BH CV ECHO MEAS - SI(TEICH): 36.4 ML/M^2
BH CV ECHO MEAS - SV(AO): 283.9 ML
BH CV ECHO MEAS - SV(CUBED): 93.3 ML
BH CV ECHO MEAS - SV(LVOT): 65.5 ML
BH CV ECHO MEAS - SV(MOD-SP2): 84.6 ML
BH CV ECHO MEAS - SV(MOD-SP4): 62.2 ML
BH CV ECHO MEAS - SV(TEICH): 77.6 ML
BH CV ECHO MEAS - TAPSE (>1.6): 3.1 CM
BH CV ECHO MEAS - TR MAX PG: 16 MMHG
BH CV ECHO MEAS - TR MAX VEL: 198.8 CM/SEC
BH CV ECHO MEASUREMENTS AVERAGE E/E' RATIO: 10.16
BH CV XLRA - RV BASE: 3.8 CM
BH CV XLRA - RV LENGTH: 6.2 CM
BH CV XLRA - RV MID: 2.8 CM
BH CV XLRA - TDI S': 15.9 CM/SEC
LEFT ATRIUM VOLUME INDEX: 20.2 ML/M^2
LEFT ATRIUM VOLUME: 43 ML
LV EF 2D ECHO EST: 60 %
MAXIMAL PREDICTED HEART RATE: 155 BPM
STRESS TARGET HR: 132 BPM

## 2022-02-08 PROCEDURE — A9500 TC99M SESTAMIBI: HCPCS | Performed by: NURSE PRACTITIONER

## 2022-02-08 PROCEDURE — 93306 TTE W/DOPPLER COMPLETE: CPT

## 2022-02-08 PROCEDURE — 93017 CV STRESS TEST TRACING ONLY: CPT

## 2022-02-08 PROCEDURE — 93018 CV STRESS TEST I&R ONLY: CPT | Performed by: INTERNAL MEDICINE

## 2022-02-08 PROCEDURE — 93306 TTE W/DOPPLER COMPLETE: CPT | Performed by: INTERNAL MEDICINE

## 2022-02-08 PROCEDURE — 78452 HT MUSCLE IMAGE SPECT MULT: CPT

## 2022-02-08 PROCEDURE — 0 TECHNETIUM SESTAMIBI: Performed by: NURSE PRACTITIONER

## 2022-02-08 PROCEDURE — 78452 HT MUSCLE IMAGE SPECT MULT: CPT | Performed by: INTERNAL MEDICINE

## 2022-02-08 RX ADMIN — TECHNETIUM TC 99M SESTAMIBI 1 DOSE: 1 INJECTION INTRAVENOUS at 12:30

## 2022-02-08 RX ADMIN — TECHNETIUM TC 99M SESTAMIBI 1 DOSE: 1 INJECTION INTRAVENOUS at 10:15

## 2022-02-10 ENCOUNTER — TELEPHONE (OUTPATIENT)
Dept: CARDIOLOGY | Facility: HOSPITAL | Age: 67
End: 2022-02-10

## 2022-02-10 LAB
BH CV REST NUCLEAR ISOTOPE DOSE: 9.9 MCI
BH CV STRESS BP STAGE 1: NORMAL
BH CV STRESS BP STAGE 2: NORMAL
BH CV STRESS DURATION MIN STAGE 1: 3
BH CV STRESS DURATION MIN STAGE 2: 3
BH CV STRESS DURATION MIN STAGE 3: 2
BH CV STRESS DURATION SEC STAGE 1: 0
BH CV STRESS DURATION SEC STAGE 2: 0
BH CV STRESS DURATION SEC STAGE 3: 0
BH CV STRESS GRADE STAGE 1: 10
BH CV STRESS GRADE STAGE 2: 12
BH CV STRESS GRADE STAGE 3: 14
BH CV STRESS HR STAGE 1: 112
BH CV STRESS HR STAGE 2: 131
BH CV STRESS HR STAGE 3: 142
BH CV STRESS METS STAGE 1: 5
BH CV STRESS METS STAGE 2: 7.5
BH CV STRESS METS STAGE 3: 10
BH CV STRESS NUCLEAR ISOTOPE DOSE: 31.5 MCI
BH CV STRESS O2 STAGE 1: 100
BH CV STRESS O2 STAGE 2: 99
BH CV STRESS O2 STAGE 3: 95
BH CV STRESS PROTOCOL 1: NORMAL
BH CV STRESS RECOVERY BP: NORMAL MMHG
BH CV STRESS RECOVERY HR: 98 BPM
BH CV STRESS RECOVERY O2: 98 %
BH CV STRESS SPEED STAGE 1: 1.7
BH CV STRESS SPEED STAGE 2: 2.5
BH CV STRESS SPEED STAGE 3: 3.4
BH CV STRESS STAGE 1: 1
BH CV STRESS STAGE 2: 2
BH CV STRESS STAGE 3: 3
LV EF NUC BP: 78 %
MAXIMAL PREDICTED HEART RATE: 155 BPM
PERCENT MAX PREDICTED HR: 92.9 %
STRESS BASELINE BP: NORMAL MMHG
STRESS BASELINE HR: 77 BPM
STRESS O2 SAT REST: 100 %
STRESS PERCENT HR: 109 %
STRESS POST ESTIMATED WORKLOAD: 10.1 METS
STRESS POST EXERCISE DUR MIN: 8 MIN
STRESS POST EXERCISE DUR SEC: 0 SEC
STRESS POST O2 SAT PEAK: 95 %
STRESS POST PEAK BP: NORMAL MMHG
STRESS POST PEAK HR: 144 BPM
STRESS TARGET HR: 132 BPM

## 2022-02-11 ENCOUNTER — HOSPITAL ENCOUNTER (OUTPATIENT)
Dept: SLEEP MEDICINE | Facility: HOSPITAL | Age: 67
Discharge: HOME | End: 2022-02-11
Admitting: INTERNAL MEDICINE

## 2022-02-11 VITALS — HEIGHT: 70 IN | BODY MASS INDEX: 30.06 KG/M2 | WEIGHT: 210 LBS

## 2022-02-11 DIAGNOSIS — G47.33 OBSTRUCTIVE SLEEP APNEA, ADULT: ICD-10-CM

## 2022-02-11 DIAGNOSIS — R06.83 SNORING: ICD-10-CM

## 2022-02-11 PROCEDURE — 95800 SLP STDY UNATTENDED: CPT

## 2022-02-11 PROCEDURE — 95800 SLP STDY UNATTENDED: CPT | Performed by: INTERNAL MEDICINE

## 2022-02-14 DIAGNOSIS — Z01.812 ENCOUNTER FOR PREPROCEDURE SCREENING LABORATORY TESTING FOR COVID-19: ICD-10-CM

## 2022-02-14 DIAGNOSIS — Z20.822 ENCOUNTER FOR PREPROCEDURE SCREENING LABORATORY TESTING FOR COVID-19: ICD-10-CM

## 2022-02-14 DIAGNOSIS — G47.33 OSA (OBSTRUCTIVE SLEEP APNEA): Primary | ICD-10-CM

## 2022-02-23 NOTE — PROGRESS NOTES
[facility] Cardiology    Encounter Date: 2022    Patient ID: Luis Felipe Siegel is a 66 y.o. male.  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 Atrial fibrillation  a. Echo 2022: EF 60%, TR with RVSP 19mmHg  b. Stress MPS 2022: No evidence of ischemia. Perfusion portion revealed inferior myocardiac perfusion defect that was worse with rest imaging prior to attenuation correction and completely absent after correction, suggesting artifact  2. Hypertension  3. Asthma    History of Present Illness: Luis Felipe Siegel is a 66 y.o. male who presents to the cardiology office today to establish care and be evaluated for atrial fibrillation. On 2021 he noticed sudden onset of palpitations after a walk. He knew he was in a fib with RVR, so he went to [facility] ED where he underwent successful cardioversion. He was started on Eliquis and given metoprolol prn. Since then, he has had no further episodes of a fib and no palpitations. He has not taken metoprolol and has been compliant with Eliquis. He has continued to walk at least one mile every day. He denies chest pain, shortness of breath, palpitations, lightheadedness, or edema. Echo and stress MPS were normal.    Past Medical History:   Past Medical History:   Diagnosis Date   • Asthma    • Atrial fibrillation (HCC)    • Bronchospasm      to Animal Dander   • Hyperlipidemia    • Hypertension    • Positive PPD     in    • Ureteral disorder        Past Surgical History:   Past Surgical History:   Procedure Laterality Date   • NO PAST SURGERIES         Family History:   Family History   Problem Relation Age of Onset   • COPD Mother    • Cancer Mother         lung?   • Atrial fibrillation Father    • Hypertension Father    • No Known Problems Brother    •  Hypertension Maternal Grandmother    • Hypertension Maternal Grandfather    • No Known Problems Paternal Grandmother    • Prostate cancer Paternal Grandfather        Social History:   Social History     Socioeconomic History   • Marital status:    Tobacco Use   • Smoking status: Never Smoker   • Smokeless tobacco: Never Used   Vaping Use   • Vaping Use: Never used   Substance and Sexual Activity   • Alcohol use: Yes     Alcohol/week: 7.0 - 14.0 standard drinks     Types: 7 - 14 Cans of beer per week   • Drug use: Never   • Sexual activity: Defer       Tobacco History:   Social History     Tobacco Use   Smoking Status Never Smoker   Smokeless Tobacco Never Used       Medications:     Current Outpatient Medications:   •  amLODIPine (NORVASC) 5 MG tablet, Take 1 tablet by mouth Daily., Disp: 90 tablet, Rfl: 1  •  apixaban (ELIQUIS) 5 MG tablet tablet, Take 1 tablet by mouth 2 (Two) Times a Day., Disp: 60 tablet, Rfl: 3  •  valsartan (Diovan) 80 MG tablet, Take 1 tablet by mouth Daily., Disp: 90 tablet, Rfl: 1  •  Ventolin  (90 Base) MCG/ACT inhaler, USE 2 INHALATIONS BY MOUTH  EVERY 4 TO 6 HOURS AS  NEEDED, Disp: 54 g, Rfl: 2  •  nebivolol (Bystolic) 5 MG tablet, Take 1 tablet by mouth Daily., Disp: 30 tablet, Rfl: 5    Allergies:   No Known Allergies  The following portions of the patient's history were reviewed and updated as appropriate: allergies, current medications, past family history, past medical history, past social history, past surgical history and problem list.    Review of Systems   Constitution: Negative for chills, fever, fatigue, generalized weakness.   Cardiovascular: Negative for chest pain, dyspnea on exertion, leg swelling, palpitations, orthopnea, and syncope.   Respiratory: Negative for cough, shortness of breath, and wheezing.  HENT: Negative for ear pain, nosebleeds, and tinnitus.  Gastrointestinal: Negative for abdominal pain, constipation, diarrhea, nausea and vomiting.  "  Genitourinary: No urinary symptoms.  Musculoskeletal: Negative for muscle cramps.  Neurological: Negative for dizziness, headaches, loss of balance, numbness, and symptoms of stroke.  Psychiatric: Normal mental status.     All other systems reviewed and are negative.        Objective:     /74 (BP Location: Right arm, Patient Position: Sitting, Cuff Size: Adult)   Pulse 70   Ht 177.8 cm (70\")   Wt 95.8 kg (211 lb 3.2 oz)   SpO2 100%   BMI 30.30 kg/m²      Physical Exam  Constitutional: Patient appears well-developed and well-nourished.   HENT: HEENT exam unremarkable.   Neck: No JVD present.  Cardiovascular: Normal rate, regular rhythm and normal heart sounds. No murmur heard.   2+ symmetric pulses.   Pulmonary/Chest: Breath sounds normal. Does not exhibit tenderness.   Abdominal: Abdomen benign.   Musculoskeletal: Does not exhibit edema.   Neurological: Neurological exam unremarkable.   Vitals reviewed.    Data Review:   Lab Results   Component Value Date    GLUCOSE 115 (H) 12/22/2021    BUN 25 (H) 12/22/2021    CREATININE 1.04 12/22/2021    EGFRIFNONA 72 12/22/2021    BCR 24.0 12/22/2021    K 4.4 12/22/2021    CO2 23.0 12/22/2021    CALCIUM 8.9 12/22/2021    ALBUMIN 4.50 12/22/2021    AST 19 12/22/2021    ALT 17 12/22/2021     Lab Results   Component Value Date    CHOL 176 12/17/2021    TRIG 204 (H) 12/17/2021    HDL 56 12/17/2021    LDL 86 12/17/2021      Lab Results   Component Value Date    WBC 5.60 12/17/2021    RBC 4.85 12/17/2021    HGB 15.4 12/17/2021    HCT 47.3 12/17/2021    MCV 97.5 (H) 12/17/2021     12/17/2021     Lab Results   Component Value Date    TSH 1.920 12/22/2021     Lab Results   Component Value Date    HGBA1C 5.50 06/16/2021          ECG 12 Lead    Date/Time: 2/25/2022 9:04 AM  Performed by: Lizeth Goldman PA-C  Authorized by: Jones, Lizeth, PA-C   Comparison: compared with previous ECG from 12/22/2021  Comparison to previous ECG: Previous EKG with Afib with " RVR  Rhythm: sinus rhythm               Assessment:      Diagnosis Plan   1. Paroxysmal atrial fibrillation (HCC)  Currently in sinus rhythm.  Discussed that beta-blocker therapy can be beneficial in preventing future episodes therefore we will discontinue amlodipine and start him on Bystolic 5 mg daily.  He will monitor his blood pressure at home after this change and notify us about any significant elevation and will further adjust the dose.  Continue Eliquis 5 mg twice daily.     2. Mixed hyperlipidemia  Acceptable profile, triglycerides are high and can improve with dietary modifications and regular exercise.     3. Essential hypertension  Controlled, due to atrial fibrillation, will discontinue amlodipine and add Bystolic 5mg     Plan:   Patient is overall stable from a cardiac standpoint, he had a single episode of atrial fibrillation that required cardioversion in the ER, did not have any subsequent recurrences.  He now wears a apple watch and has been on Eliquis.  Beta-blocker therapy was prescribed but not initiated by the patient.  At this time I recommended that we discontinue amlodipine and initiate Bystolic 5mg, he will monitor blood pressure and report back in case of significant fluctuation and we will suggest the dose.  We discussed the importance of cutting back on his alcohol consumption.   The findings of unremarkable echocardiogram and stress test discussed and he was reassured.  Continue all other current medications.   FU in 6 MO, sooner as needed.  Thank you for allowing us to participate in the care of your patient.     Scribed for Radha Benitez MD by Lizeth Goldman PA-C. 2/25/2022  09:06 EST     I, Radha Benitez MD, personally performed the services described in this documentation as scribed by the above named individual in my presence, and it is both accurate and complete.  2/25/2022  09:16 EST        Part of this note may be an electronic transcription/translation of spoken language to  printed text using the Dragon Dictation System.

## 2022-02-24 PROBLEM — I48.0 PAROXYSMAL ATRIAL FIBRILLATION (HCC): Status: ACTIVE | Noted: 2022-02-24

## 2022-02-25 ENCOUNTER — OFFICE VISIT (OUTPATIENT)
Dept: CARDIOLOGY | Facility: CLINIC | Age: 67
End: 2022-02-25

## 2022-02-25 VITALS
SYSTOLIC BLOOD PRESSURE: 132 MMHG | WEIGHT: 211.2 LBS | OXYGEN SATURATION: 100 % | HEIGHT: 70 IN | BODY MASS INDEX: 30.24 KG/M2 | HEART RATE: 70 BPM | DIASTOLIC BLOOD PRESSURE: 74 MMHG

## 2022-02-25 DIAGNOSIS — I48.0 PAROXYSMAL ATRIAL FIBRILLATION: Primary | ICD-10-CM

## 2022-02-25 DIAGNOSIS — I10 ESSENTIAL HYPERTENSION: ICD-10-CM

## 2022-02-25 DIAGNOSIS — E78.2 MIXED HYPERLIPIDEMIA: ICD-10-CM

## 2022-02-25 PROCEDURE — 93000 ELECTROCARDIOGRAM COMPLETE: CPT | Performed by: INTERNAL MEDICINE

## 2022-02-25 PROCEDURE — 99204 OFFICE O/P NEW MOD 45 MIN: CPT | Performed by: INTERNAL MEDICINE

## 2022-02-25 RX ORDER — NEBIVOLOL 5 MG/1
5 TABLET ORAL DAILY
Qty: 30 TABLET | Refills: 5 | Status: SHIPPED | OUTPATIENT
Start: 2022-02-25 | End: 2022-08-26 | Stop reason: SDUPTHER

## 2022-06-17 ENCOUNTER — OFFICE VISIT (OUTPATIENT)
Dept: INTERNAL MEDICINE | Facility: CLINIC | Age: 67
End: 2022-06-17

## 2022-06-17 ENCOUNTER — LAB (OUTPATIENT)
Dept: LAB | Facility: HOSPITAL | Age: 67
End: 2022-06-17

## 2022-06-17 VITALS
HEART RATE: 60 BPM | WEIGHT: 208.2 LBS | DIASTOLIC BLOOD PRESSURE: 84 MMHG | SYSTOLIC BLOOD PRESSURE: 136 MMHG | BODY MASS INDEX: 29.81 KG/M2 | HEIGHT: 70 IN | TEMPERATURE: 98 F

## 2022-06-17 DIAGNOSIS — Z12.5 SCREENING FOR PROSTATE CANCER: ICD-10-CM

## 2022-06-17 DIAGNOSIS — Z23 NEED FOR PNEUMOCOCCAL VACCINE: ICD-10-CM

## 2022-06-17 DIAGNOSIS — E66.3 OVERWEIGHT (BMI 25.0-29.9): ICD-10-CM

## 2022-06-17 DIAGNOSIS — I48.0 PAROXYSMAL ATRIAL FIBRILLATION: ICD-10-CM

## 2022-06-17 DIAGNOSIS — E78.2 MIXED HYPERLIPIDEMIA: ICD-10-CM

## 2022-06-17 DIAGNOSIS — I10 ESSENTIAL HYPERTENSION: ICD-10-CM

## 2022-06-17 DIAGNOSIS — I10 ESSENTIAL HYPERTENSION: Primary | ICD-10-CM

## 2022-06-17 LAB
ALBUMIN SERPL-MCNC: 4.5 G/DL (ref 3.5–5.2)
ALBUMIN UR-MCNC: <1.2 MG/DL
ALBUMIN/GLOB SERPL: 1.9 G/DL
ALP SERPL-CCNC: 76 U/L (ref 39–117)
ALT SERPL W P-5'-P-CCNC: 18 U/L (ref 1–41)
ANION GAP SERPL CALCULATED.3IONS-SCNC: 13 MMOL/L (ref 5–15)
AST SERPL-CCNC: 26 U/L (ref 1–40)
BASOPHILS # BLD AUTO: 0.06 10*3/MM3 (ref 0–0.2)
BASOPHILS NFR BLD AUTO: 1 % (ref 0–1.5)
BILIRUB SERPL-MCNC: 0.3 MG/DL (ref 0–1.2)
BUN SERPL-MCNC: 24 MG/DL (ref 8–23)
BUN/CREAT SERPL: 25 (ref 7–25)
CALCIUM SPEC-SCNC: 9 MG/DL (ref 8.6–10.5)
CHLORIDE SERPL-SCNC: 104 MMOL/L (ref 98–107)
CHOLEST SERPL-MCNC: 156 MG/DL (ref 0–200)
CO2 SERPL-SCNC: 23 MMOL/L (ref 22–29)
CREAT SERPL-MCNC: 0.96 MG/DL (ref 0.76–1.27)
CREAT UR-MCNC: 129.9 MG/DL
DEPRECATED RDW RBC AUTO: 45 FL (ref 37–54)
EGFRCR SERPLBLD CKD-EPI 2021: 87.2 ML/MIN/1.73
EOSINOPHIL # BLD AUTO: 0.09 10*3/MM3 (ref 0–0.4)
EOSINOPHIL NFR BLD AUTO: 1.5 % (ref 0.3–6.2)
ERYTHROCYTE [DISTWIDTH] IN BLOOD BY AUTOMATED COUNT: 12.8 % (ref 12.3–15.4)
GLOBULIN UR ELPH-MCNC: 2.4 GM/DL
GLUCOSE SERPL-MCNC: 98 MG/DL (ref 65–99)
HCT VFR BLD AUTO: 44.9 % (ref 37.5–51)
HDLC SERPL-MCNC: 51 MG/DL (ref 40–60)
HGB BLD-MCNC: 15 G/DL (ref 13–17.7)
IMM GRANULOCYTES # BLD AUTO: 0.03 10*3/MM3 (ref 0–0.05)
IMM GRANULOCYTES NFR BLD AUTO: 0.5 % (ref 0–0.5)
LDLC SERPL CALC-MCNC: 79 MG/DL (ref 0–100)
LDLC/HDLC SERPL: 1.46 {RATIO}
LYMPHOCYTES # BLD AUTO: 1.53 10*3/MM3 (ref 0.7–3.1)
LYMPHOCYTES NFR BLD AUTO: 24.8 % (ref 19.6–45.3)
MCH RBC QN AUTO: 32.3 PG (ref 26.6–33)
MCHC RBC AUTO-ENTMCNC: 33.4 G/DL (ref 31.5–35.7)
MCV RBC AUTO: 96.6 FL (ref 79–97)
MICROALBUMIN/CREAT UR: NORMAL MG/G{CREAT}
MONOCYTES # BLD AUTO: 0.55 10*3/MM3 (ref 0.1–0.9)
MONOCYTES NFR BLD AUTO: 8.9 % (ref 5–12)
NEUTROPHILS NFR BLD AUTO: 3.9 10*3/MM3 (ref 1.7–7)
NEUTROPHILS NFR BLD AUTO: 63.3 % (ref 42.7–76)
NRBC BLD AUTO-RTO: 0 /100 WBC (ref 0–0.2)
PLATELET # BLD AUTO: 241 10*3/MM3 (ref 140–450)
PMV BLD AUTO: 9.8 FL (ref 6–12)
POTASSIUM SERPL-SCNC: 4.6 MMOL/L (ref 3.5–5.2)
PROT SERPL-MCNC: 6.9 G/DL (ref 6–8.5)
PSA SERPL-MCNC: 0.64 NG/ML (ref 0–4)
RBC # BLD AUTO: 4.65 10*6/MM3 (ref 4.14–5.8)
SODIUM SERPL-SCNC: 140 MMOL/L (ref 136–145)
TRIGL SERPL-MCNC: 152 MG/DL (ref 0–150)
VLDLC SERPL-MCNC: 26 MG/DL (ref 5–40)
WBC NRBC COR # BLD: 6.16 10*3/MM3 (ref 3.4–10.8)

## 2022-06-17 PROCEDURE — 82043 UR ALBUMIN QUANTITATIVE: CPT

## 2022-06-17 PROCEDURE — 85025 COMPLETE CBC W/AUTO DIFF WBC: CPT

## 2022-06-17 PROCEDURE — 99214 OFFICE O/P EST MOD 30 MIN: CPT | Performed by: NURSE PRACTITIONER

## 2022-06-17 PROCEDURE — 82570 ASSAY OF URINE CREATININE: CPT

## 2022-06-17 PROCEDURE — 80053 COMPREHEN METABOLIC PANEL: CPT

## 2022-06-17 PROCEDURE — 80061 LIPID PANEL: CPT

## 2022-06-17 PROCEDURE — G0103 PSA SCREENING: HCPCS

## 2022-06-17 NOTE — PROGRESS NOTES
Luis Felipe Siegel  1955  [id number]  Patient Care Team:  Vi Solis APRN as PCP - General (Internal Medicine)  Moe Santacruz MD as Consulting Physician (Urology)    Luis Felipe Siegel is a pleasant 67 y.o. male who has a pertinent medical history of A. fib, hyperlipidemia, and hypertension.  He presents for evaluation of HTN.    Chief Complaint   Patient presents with   • Hypertension     6 mo f/u        HPI:   Luis Felipe Siegel is a pleasant 67 y.o. male who has a pertinent medical history of asthma, A. fib, hyperlipidemia, and hypertension.  He presents for evaluation of HTN.    He checks his blood pressure daily.  His blood pressures have ranged from systolic of 104-120's, and diastolics from 43-70.  He reports that he was taking valsartan daily.  He was having blood pressures around 90/50 and stopped taking the valsartan.    He was seen at the ER for A. fib in December.  He was cardioverted and started on Eliquis 5 mg daily.  He has had no episodes of A. fib since the appointment that he is aware of.  He sees cardiology again in August.     Luis Felipe attempts to exercise daily.  On Mondays and Tuesdays he walks about 1.3 miles, Wednesday Thursday and Friday he walks 2 miles.  He describes his diet as healthy, avoiding fast food and fried foods.  He denies tobacco use.  Denies drug use.  Luis Felipe states that he drinks about 2 alcoholic beverages daily.    Past Medical History:   Diagnosis Date   • Asthma    • Atrial fibrillation (HCC)    • Bronchospasm      to Animal Dander   • Hyperlipidemia    • Hypertension    • Positive PPD     in 1983   • Ureteral disorder      Past Surgical History:   Procedure Laterality Date   • NO PAST SURGERIES       Family History   Problem Relation Age of Onset   • COPD Mother    • Cancer Mother         lung?   • Atrial fibrillation Father    • Hypertension Father    • No Known Problems Brother    • Hypertension Maternal Grandmother    • Hypertension Maternal Grandfather   "  • No Known Problems Paternal Grandmother    • Prostate cancer Paternal Grandfather      Social History     Tobacco Use   Smoking Status Never Smoker   Smokeless Tobacco Never Used     No Known Allergies    Current Outpatient Medications:   •  apixaban (ELIQUIS) 5 MG tablet tablet, Take 1 tablet by mouth 2 (Two) Times a Day., Disp: 180 tablet, Rfl: 0  •  nebivolol (Bystolic) 5 MG tablet, Take 1 tablet by mouth Daily., Disp: 30 tablet, Rfl: 5  •  Ventolin  (90 Base) MCG/ACT inhaler, USE 2 INHALATIONS BY MOUTH  EVERY 4 TO 6 HOURS AS  NEEDED, Disp: 54 g, Rfl: 2    Review of Systems   Constitutional: Negative for activity change, chills, diaphoresis, fatigue and fever.   Respiratory: Negative for apnea, cough, choking, chest tightness, shortness of breath and wheezing.    Cardiovascular: Negative for chest pain, palpitations and leg swelling.   Gastrointestinal: Negative for diarrhea, nausea, vomiting and GERD.   Neurological: Negative for dizziness, syncope, weakness, light-headedness and headache.   Psychiatric/Behavioral: Negative for agitation and depressed mood. The patient is not nervous/anxious.      Review of systems was completed, and pertinent findings are noted in the HPI.  /84 (BP Location: Left arm, Patient Position: Sitting, Cuff Size: Adult)   Pulse 60   Temp 98 °F (36.7 °C) (Temporal)   Ht 177.8 cm (70\")   Wt 94.4 kg (208 lb 3.2 oz)   BMI 29.87 kg/m²     Physical Exam  Vitals reviewed.   Constitutional:       Appearance: Normal appearance.   HENT:      Head: Normocephalic.   Cardiovascular:      Rate and Rhythm: Normal rate and regular rhythm.   Pulmonary:      Effort: Pulmonary effort is normal.      Breath sounds: Normal breath sounds.   Skin:     General: Skin is warm and dry.   Neurological:      Mental Status: He is alert.   Psychiatric:         Mood and Affect: Mood normal.         Behavior: Behavior normal.         Thought Content: Thought content normal.         Judgment: " Judgment normal.         Procedures    PHQ-9 Total Score:      Assessment/Plan:  Diagnoses and all orders for this visit:    1. Essential hypertension (Primary)  Comments:  Labs today. Continue Bystolic 5 mg daily. Continue amlodipine 5 mg daily. Cardiology in August.  Orders:  -     CBC & Differential; Future  -     Comprehensive Metabolic Panel; Future  -     Microalbumin / Creatinine Urine Ratio - Urine, Clean Catch; Future    2. Mixed hyperlipidemia  Comments:  Labs today.  Orders:  -     Lipid Panel; Future    3. Paroxysmal atrial fibrillation (HCC)  Comments:  Continue Eliquis 5 mg daily. Cardiology appt in August.    4. Overweight (BMI 25.0-29.9)  Comments:  Continue healthy diet and exercise.    5. Screening for prostate cancer  Comments:  PSA today.  Orders:  -     Cancel: PSA DIAGNOSTIC; Future  -     PSA Screen; Future       There are no Patient Instructions on file for this visit.  Plan of care reviewed with patient at the conclusion of today's visit. Education was provided regarding diagnosis, management and any prescribed or recommended OTC medications.  Patient verbalizes understanding of and agreement with management plan.    Return in about 6 months (around 12/17/2022) for Annual physical, Labs this visit, Labs next visit.    Dictated Utilizing Dragon Dictation.    TARI Leonardo          Answers for HPI/ROS submitted by the patient on 6/15/2022  What is the primary reason for your visit?: Other  Please describe your symptoms.: None  Have you had these symptoms before?: No  How long have you been having these symptoms?: 1-4 days  Please list any medications you are currently taking for this condition.: Nebivolol 5 mg daily, Eliquis 5 mg twice daily, Albuterol as needed  Please describe any probable cause for these symptoms. : None

## 2022-06-27 RX ORDER — ALBUTEROL SULFATE 90 UG/1
AEROSOL, METERED RESPIRATORY (INHALATION)
Qty: 25.5 G | Refills: 6 | Status: SHIPPED | OUTPATIENT
Start: 2022-06-27

## 2022-06-27 NOTE — TELEPHONE ENCOUNTER
Rx Refill Note  Requested Prescriptions     Pending Prescriptions Disp Refills   • albuterol sulfate  (90 Base) MCG/ACT inhaler [Pharmacy Med Name: Albuterol Sulfate  (90 Base) MCG/ACT Inhalation Aerosol Solution] 25.5 g 6     Sig: USE 2 INHALATIONS BY MOUTH  EVERY 4 HOURS AS NEEDED FOR COUGH, WHEEZING, SHORTNESS  OF BREATH      Last office visit with prescribing clinician: 6/17/2022      Next office visit with prescribing clinician: 12/21/2022            Carol Briones  06/27/22, 08:29 EDT

## 2022-08-26 ENCOUNTER — TELEPHONE (OUTPATIENT)
Dept: INTERNAL MEDICINE | Facility: CLINIC | Age: 67
End: 2022-08-26

## 2022-08-26 RX ORDER — NEBIVOLOL 5 MG/1
5 TABLET ORAL DAILY
Qty: 90 TABLET | Refills: 1 | Status: SHIPPED | OUTPATIENT
Start: 2022-08-26 | End: 2023-01-03 | Stop reason: SDUPTHER

## 2022-08-26 NOTE — TELEPHONE ENCOUNTER
It looks like he was COVID vaccinated with 2 boosters, so he should be fairly well protected.  With mild symptoms there is the option to continue to monitor, use over-the-counter medications like Tylenol for aches, Mucinex for congestion.  However he would qualify for Paxlovid if symptoms have lasted less than 5 days.  If he would like to discuss treatment with this medication please schedule with [facility].

## 2022-08-26 NOTE — TELEPHONE ENCOUNTER
Caller: Luis Felipe Siegel    Relationship to patient: Self    Best call back number:     [phone]    Date of exposure:     UNKNOWN    Date of positive COVID19 test:     8/26/22    COVID19 symptoms:     RUNNY NOSE  SORE THROAT    PATIENT STATED HIS SYMPTOMS ARE MORE LIKE A MILD COLD    Date of initial quarantine:     TODAY, 8/26/22    Additional information or concerns:     PATIENT REQUESTED A CALL BACK WITH ANY RECOMMENDATIONS AND/OR NEXT STEPS INCLUDING IF HE WOULD BE CONSIDERED A CANDIDATE FOR PAXLOVID, ANTIVIRAL MEDICATION, BECAUSE OF HIS AGE    What is the patients preferred pharmacy:     [facility] [place]    TELEPHONE CONTACT:    [phone]    JANIS BILLS

## 2022-08-26 NOTE — TELEPHONE ENCOUNTER
"Patient cancelled appointment today due to testing positive this morning for Covid. Appointment rescheduled. Patient states, \"I have not been in A.Fib since that episode last year, I would like an appointment sooner so that I can come off the blood thinners.\"  Instructed to stay on Eliquis and talk about this during the appointment. He verbalized understanding.   "

## 2022-09-06 RX ORDER — APIXABAN 5 MG/1
TABLET, FILM COATED ORAL
Qty: 180 TABLET | Refills: 0 | Status: SHIPPED | OUTPATIENT
Start: 2022-09-06 | End: 2022-12-21

## 2022-09-19 NOTE — PROGRESS NOTES
[facility] Cardiology    Encounter Date: 2022    Patient ID: Luis Felipe Siegel is a 67 y.o. male.  : 1955     PCP: Vi Solis APRN       Chief Complaint: No chief complaint on file.      PROBLEM LIST:  1. Atrial fibrillation  a. Echo, 2022: EF 60%, TR with RVSP 19mmHg  b. Stress MPS, 2022: No evidence of ischemia. Perfusion portion revealed inferior myocardiac perfusion defect that was worse with rest imaging prior to attenuation correction and completely absent after correction, suggesting artifact  2. Hypertension  3. Asthma    History of Present Illness  Patient presents today for a 6 month follow-up with a history of paroxysmal atrial fibrillation and cardiac risk factors. Since last visit, patient has been doing well overall from a cardiovascular standpoint. He states that he has not experienced atrial fibrillation since his cardioversion. He also has not been tachycardic with measurements via his smart watch. His average measured heart rate is 55-65 bpm with a maximum of 70 bpm when exercising. His blood pressure was typically 115-120 mmHg systolic after initiating the Bystolic, however, it is 140/78 mmHg in office today. He does not measure his blood pressure at home, however, it was 118 mmHg systolic when it was measured at the dentist a couple of weeks ago. Patient maintains a stable activity level by walking 2 and 1/2 miles daily. He continues to work part time. He also contracted COVID-19 last month with minimal symptoms including nasal congestion. Patient denies chest pain, shortness of breath, orthopnea, palpitations, edema, dizziness, and syncope.  States that he would like to come off Eliquis if possible.    No Known Allergies      Current Outpatient Medications:   •  albuterol sulfate  (90 Base) MCG/ACT inhaler, USE 2 INHALATIONS BY MOUTH  EVERY 4 HOURS AS NEEDED FOR COUGH, WHEEZING, SHORTNESS  OF BREATH, Disp: 25.5 g, Rfl: 6  •   "Eliquis 5 MG tablet tablet, TAKE ONE TABLET BY MOUTH TWICE A DAY, Disp: 180 tablet, Rfl: 0  •  nebivolol (Bystolic) 5 MG tablet, Take 1 tablet by mouth Daily., Disp: 90 tablet, Rfl: 1    The following portions of the patient's history were reviewed and updated as appropriate: allergies, current medications, past family history, past medical history, past social history, past surgical history and problem list.    ROS  Review of Systems   Constitution: Negative for chills, fever, fatigue, generalized weakness.   Cardiovascular: Negative for chest pain, dyspnea on exertion, leg swelling, palpitations, orthopnea, and syncope.   Respiratory: Negative for cough, shortness of breath, and wheezing.  HENT: Negative for ear pain, nosebleeds, and tinnitus.  Gastrointestinal: Negative for abdominal pain, constipation, diarrhea, nausea and vomiting.   Genitourinary: No urinary symptoms.  Musculoskeletal: Negative for muscle cramps.  Neurological: Negative for dizziness, headaches, loss of balance, numbness, and symptoms of stroke.  Psychiatric: Normal mental status.     All other systems reviewed and are negative.        Objective:     /78 (BP Location: Right arm, Patient Position: Sitting, Cuff Size: Adult)   Pulse 66   Ht 177.8 cm (70\")   Wt 91.6 kg (202 lb)   SpO2 98%   BMI 28.98 kg/m²    BP repeat: 140/70    Physical Exam  Constitutional: Patient appears well-developed and well-nourished.   HENT: HEENT exam unremarkable.   Neck: Neck supple. No JVD present. No carotid bruits.   Cardiovascular: Normal rate, regular rhythm and normal heart sounds. No murmur heard.   2+ symmetric pulses.   Pulmonary/Chest: Breath sounds normal. Does not exhibit tenderness.   Abdominal: Abdomen benign.   Musculoskeletal: Does not exhibit edema.   Neurological: Neurological exam unremarkable.   Vitals reviewed.    Data Review:   Lab Results   Component Value Date    GLUCOSE 98 06/17/2022    BUN 24 (H) 06/17/2022    CREATININE 0.96 " 06/17/2022    BCR 25.0 06/17/2022     06/17/2022    K 4.6 06/17/2022    CO2 23.0 06/17/2022    CALCIUM 9.0 06/17/2022    ALBUMIN 4.50 06/17/2022    AST 26 06/17/2022    ALT 18 06/17/2022     Lab Results   Component Value Date    CHOL 156 06/17/2022    TRIG 152 (H) 06/17/2022    HDL 51 06/17/2022    LDL 79 06/17/2022      Lab Results   Component Value Date    WBC 6.16 06/17/2022    RBC 4.65 06/17/2022    HGB 15.0 06/17/2022    HCT 44.9 06/17/2022    MCV 96.6 06/17/2022     06/17/2022     Lab Results   Component Value Date    TSH 1.920 12/22/2021        Procedures             Assessment:      Diagnosis Plan   1. Paroxysmal atrial fibrillation (HCC)  Stable and asymptomatic. 48h Holter monitor to be ordered to assess atrial fibrillation burden, if negative we can consider stopping Eliquis and treating with aspirin 81 mg daily.    2. Mixed hyperlipidemia  Well controlled. LDL 79 on 06/17/2022.  Will manage with diet and exercise.   3. Essential hypertension   elevated today with reported normal readings at home.  Patient advised to start monitoring blood pressure and let us know if this is persistently elevated.  Meanwhile he was advised to start Dash diet, restrict salt/sodium consumption and try to lose a few more pounds of weight and get to his normal BMI.  Continue Bystolic at current dose.       Plan:   Stable cardiac status.   48h Holter monitor to be ordered to assess atrial fibrillation burden, if no atrial fibrillation noted we can consider stopping Eliquis and is treating with aspirin daily.   Continue Bystolic, restrict salt/sodium, try DASH diet and try to get to normal BMI.   Monitor blood pressure at home in case of persistent elevation he will notify us for further recommendations.   Continue current medications.   FU in 12 MO, sooner as needed.  Thank you for allowing us to participate in the care of your patient.     Scribed for Radha Benitez MD by Meena Castro. 9/23/2022 08:47 EDT    I,  Radha Benitez MD, personally performed the services described in this documentation as scribed by the above named individual in my presence, and it is both accurate and complete.  9/23/2022  09:30 EDT      Please note that portions of this note may have been completed with a voice recognition program. Efforts were made to edit the dictations, but occasionally words are mistranscribed.

## 2022-09-23 ENCOUNTER — OFFICE VISIT (OUTPATIENT)
Dept: CARDIOLOGY | Facility: CLINIC | Age: 67
End: 2022-09-23

## 2022-09-23 VITALS
HEIGHT: 70 IN | BODY MASS INDEX: 28.92 KG/M2 | HEART RATE: 66 BPM | WEIGHT: 202 LBS | OXYGEN SATURATION: 98 % | SYSTOLIC BLOOD PRESSURE: 140 MMHG | DIASTOLIC BLOOD PRESSURE: 78 MMHG

## 2022-09-23 DIAGNOSIS — I48.0 PAROXYSMAL ATRIAL FIBRILLATION: Primary | ICD-10-CM

## 2022-09-23 DIAGNOSIS — I10 ESSENTIAL HYPERTENSION: ICD-10-CM

## 2022-09-23 DIAGNOSIS — E78.2 MIXED HYPERLIPIDEMIA: ICD-10-CM

## 2022-09-23 PROCEDURE — 99214 OFFICE O/P EST MOD 30 MIN: CPT | Performed by: INTERNAL MEDICINE

## 2022-12-21 ENCOUNTER — OFFICE VISIT (OUTPATIENT)
Dept: INTERNAL MEDICINE | Facility: CLINIC | Age: 67
End: 2022-12-21

## 2022-12-21 ENCOUNTER — LAB (OUTPATIENT)
Dept: LAB | Facility: HOSPITAL | Age: 67
End: 2022-12-21

## 2022-12-21 VITALS
HEIGHT: 72 IN | BODY MASS INDEX: 28.74 KG/M2 | SYSTOLIC BLOOD PRESSURE: 136 MMHG | DIASTOLIC BLOOD PRESSURE: 76 MMHG | HEART RATE: 61 BPM | WEIGHT: 212.2 LBS | OXYGEN SATURATION: 98 % | TEMPERATURE: 98 F

## 2022-12-21 DIAGNOSIS — I10 ESSENTIAL HYPERTENSION: ICD-10-CM

## 2022-12-21 DIAGNOSIS — E78.2 MIXED HYPERLIPIDEMIA: ICD-10-CM

## 2022-12-21 DIAGNOSIS — J30.81 ALLERGY TO CATS: ICD-10-CM

## 2022-12-21 DIAGNOSIS — Z00.00 ROUTINE MEDICAL EXAM: Primary | ICD-10-CM

## 2022-12-21 DIAGNOSIS — I48.0 PAROXYSMAL ATRIAL FIBRILLATION: ICD-10-CM

## 2022-12-21 LAB
ALBUMIN UR-MCNC: <1.2 MG/DL
BASOPHILS # BLD AUTO: 0.07 10*3/MM3 (ref 0–0.2)
BASOPHILS NFR BLD AUTO: 1.2 % (ref 0–1.5)
CREAT UR-MCNC: 139.2 MG/DL
DEPRECATED RDW RBC AUTO: 44.3 FL (ref 37–54)
EOSINOPHIL # BLD AUTO: 0.24 10*3/MM3 (ref 0–0.4)
EOSINOPHIL NFR BLD AUTO: 4.2 % (ref 0.3–6.2)
ERYTHROCYTE [DISTWIDTH] IN BLOOD BY AUTOMATED COUNT: 12.8 % (ref 12.3–15.4)
HCT VFR BLD AUTO: 41.8 % (ref 37.5–51)
HGB BLD-MCNC: 14.5 G/DL (ref 13–17.7)
IMM GRANULOCYTES # BLD AUTO: 0.02 10*3/MM3 (ref 0–0.05)
IMM GRANULOCYTES NFR BLD AUTO: 0.3 % (ref 0–0.5)
LYMPHOCYTES # BLD AUTO: 1.7 10*3/MM3 (ref 0.7–3.1)
LYMPHOCYTES NFR BLD AUTO: 29.5 % (ref 19.6–45.3)
MCH RBC QN AUTO: 32.7 PG (ref 26.6–33)
MCHC RBC AUTO-ENTMCNC: 34.7 G/DL (ref 31.5–35.7)
MCV RBC AUTO: 94.1 FL (ref 79–97)
MICROALBUMIN/CREAT UR: NORMAL MG/G{CREAT}
MONOCYTES # BLD AUTO: 0.43 10*3/MM3 (ref 0.1–0.9)
MONOCYTES NFR BLD AUTO: 7.5 % (ref 5–12)
NEUTROPHILS NFR BLD AUTO: 3.31 10*3/MM3 (ref 1.7–7)
NEUTROPHILS NFR BLD AUTO: 57.3 % (ref 42.7–76)
NRBC BLD AUTO-RTO: 0 /100 WBC (ref 0–0.2)
PLATELET # BLD AUTO: 222 10*3/MM3 (ref 140–450)
PMV BLD AUTO: 9.8 FL (ref 6–12)
RBC # BLD AUTO: 4.44 10*6/MM3 (ref 4.14–5.8)
WBC NRBC COR # BLD: 5.77 10*3/MM3 (ref 3.4–10.8)

## 2022-12-21 PROCEDURE — 90471 IMMUNIZATION ADMIN: CPT | Performed by: NURSE PRACTITIONER

## 2022-12-21 PROCEDURE — 90662 IIV NO PRSV INCREASED AG IM: CPT | Performed by: NURSE PRACTITIONER

## 2022-12-21 PROCEDURE — 80061 LIPID PANEL: CPT

## 2022-12-21 PROCEDURE — 80053 COMPREHEN METABOLIC PANEL: CPT

## 2022-12-21 PROCEDURE — 82570 ASSAY OF URINE CREATININE: CPT

## 2022-12-21 PROCEDURE — 99397 PER PM REEVAL EST PAT 65+ YR: CPT | Performed by: NURSE PRACTITIONER

## 2022-12-21 PROCEDURE — 90677 PCV20 VACCINE IM: CPT | Performed by: NURSE PRACTITIONER

## 2022-12-21 PROCEDURE — 90472 IMMUNIZATION ADMIN EACH ADD: CPT | Performed by: NURSE PRACTITIONER

## 2022-12-21 PROCEDURE — 85025 COMPLETE CBC W/AUTO DIFF WBC: CPT

## 2022-12-21 PROCEDURE — 82043 UR ALBUMIN QUANTITATIVE: CPT

## 2022-12-21 PROCEDURE — 84443 ASSAY THYROID STIM HORMONE: CPT

## 2022-12-21 RX ORDER — ASPIRIN 81 MG/1
81 TABLET, CHEWABLE ORAL DAILY
COMMUNITY

## 2022-12-21 NOTE — PROGRESS NOTES
Luis Felipe Siegel  1955  [id number]  Patient Care Team:  Vi Solis APRN as PCP - General (Internal Medicine)  Moe Santacruz MD as Consulting Physician (Urology)  Radha Benitez MD as Consulting Physician (Cardiology)    Luis Felipe Siegel is a 67 y.o. pleasant male here today for annual physical.  Chief Complaint   Patient presents with   • Annual Exam   • Hypertension       HPI:   Luis Felipe Siegel is a pleasant 67 y.o. male who has a pertinent medical history of asthma, A. fib, hyperlipidemia, and hypertension.  He is here for a physical.    The patient states that he is doing well. He states that his blood pressure at home is better than it is in the office. He states that his blood pressure was 126/66 mmHg at home this morning. He states that he had COVID-19 at the end of 08/2022. He was asymptomatic, but after that his blood pressure was higher than it had been. He states that his blood pressure was normally in the 110s mmHg, but now it is consistently about 125 mmHg. He states that his diastolic is always in the 60s mmHg. He states that the only time he ever seems to be lower than 120 mmHg is if he checks it right after he walks. He states that he walks 2.5 miles 5 days a week. He states that he walks 1.5 miles around his block at lunch. He states that he checks his blood pressure periodically after he takes his medication. He states that his blood pressure is consistently 126 mmHg to 127 mmHg over 66 mmHg to 65 mmHg. He states that he is relatively active. He states that he played golf a few times this month and walked 18 holes. He states that his Apple watch tells him that it is a 6 mile walk. He states that he does not have any trouble with that other than he gets a little winded on certain hills. He denies any lower extremity swelling or chest pain. He states that his highest heart rate was 88 beats per minute.  No further episodes of A. fib.  He wore a Zio patch monitor for 48 hours and  "subsequently he and cardiology made the decision to stop Eliquis.     He states that he uses his albuterol inhaler because he is around the cats every day however, he does not wheeze. He states that he does not use it if he is away from the house.    He denies any heartburn, acid reflux, indigestion, constipation, or diarrhea. He denies any abdominal pain, skin issues or lower extremity edema. He states that he did have swelling when he was on a higher dose of amlodipine but, it went away as soon as he discontinued the medication.  Past Medical History:   Diagnosis Date   • Abnormal ECG 12/22/21    New Afib   • Arrhythmia 12/22/21    Afib   • Asthma    • Atrial fibrillation (HCC)    • Bronchospasm      to [employer]   • COVID-19 08/2022   • Hyperlipidemia    • Hypertension    • Positive PPD     in 1983   • Ureteral disorder      Past Surgical History:   Procedure Laterality Date   • NO PAST SURGERIES       Family History   Problem Relation Age of Onset   • COPD Mother    • Cancer Mother         lung?   • Atrial fibrillation Father    • Hypertension Father    • Arrhythmia Father    • No Known Problems Brother    • Hypertension Maternal Grandmother    • Hypertension Maternal Grandfather    • No Known Problems Paternal Grandmother    • Prostate cancer Paternal Grandfather      Social History     Tobacco Use   Smoking Status Never   Smokeless Tobacco Never     No Known Allergies    Current Outpatient Medications:   •  albuterol sulfate  (90 Base) MCG/ACT inhaler, USE 2 INHALATIONS BY MOUTH  EVERY 4 HOURS AS NEEDED FOR COUGH, WHEEZING, SHORTNESS  OF BREATH, Disp: 25.5 g, Rfl: 6  •  aspirin 81 MG chewable tablet, Chew 81 mg Daily., Disp: , Rfl:   •  nebivolol (Bystolic) 5 MG tablet, Take 1 tablet by mouth Daily., Disp: 90 tablet, Rfl: 1    Review of Systems    /76 (BP Location: Left arm, Patient Position: Sitting, Cuff Size: Adult)   Pulse 61   Temp 98 °F (36.7 °C) (Infrared)   Ht 182.9 cm (72\")   Wt " 96.3 kg (212 lb 3.2 oz)   SpO2 98%   BMI 28.78 kg/m²     Physical Exam  Vitals reviewed.   Constitutional:       Appearance: Normal appearance. He is well-developed.   HENT:      Head: Normocephalic and atraumatic.      Comments: *wearing mask     Right Ear: External ear normal.      Left Ear: External ear normal.   Eyes:      Conjunctiva/sclera: Conjunctivae normal.      Pupils: Pupils are equal, round, and reactive to light.   Cardiovascular:      Rate and Rhythm: Normal rate and regular rhythm.      Pulses: Normal pulses.      Heart sounds: Normal heart sounds.   Pulmonary:      Effort: Pulmonary effort is normal.      Breath sounds: Normal breath sounds.   Abdominal:      General: Abdomen is flat. Bowel sounds are normal.      Palpations: Abdomen is soft.      Tenderness: There is no abdominal tenderness.   Musculoskeletal:         General: Normal range of motion.      Cervical back: Normal range of motion and neck supple.      Right lower leg: No edema.      Left lower leg: No edema.   Skin:     General: Skin is warm and dry.   Neurological:      Mental Status: He is alert and oriented to person, place, and time.   Psychiatric:         Mood and Affect: Mood normal.         Behavior: Behavior normal.         Thought Content: Thought content normal.         Judgment: Judgment normal.         Results Review:  I reviewed the patient's new clinical results.    PHQ-9 Total Score: 0          Assessment/Plan:  Diagnoses and all orders for this visit:    1. Routine medical exam (Primary)  -     Fluzone High-Dose 65+yrs (4898-1150)  -     Pneumococcal Conjugate Vaccine 20-Valent All    2. Essential hypertension  -     CBC & Differential; Future  -     Comprehensive Metabolic Panel; Future  -     Microalbumin / Creatinine Urine Ratio - Urine, Clean Catch; Future    3. Mixed hyperlipidemia  -     Lipid Panel; Future  -     TSH Rfx On Abnormal To Free T4; Future    4. Paroxysmal atrial fibrillation (HCC)    5. Allergy to  cats  Comments:  Continue albuterol as needed       1. Hypertension  The patient's blood pressure is well controlled at this time. He will continue his current medication regimen.    2. Atrial fibrillation  The patient will continue to follow up with Dr. Benitez. He will continue to take aspirin.    3. Health maintenance  The patient will receive his Prevnar 20 vaccination today. He will receive his shingles vaccination in the next several months.    There are no Patient Instructions on file for this visit.    Counseling/Anticipatory Guidance:   Plan of care reviewed with patient at the conclusion of today's visit. Education was provided in regards to diagnosis, diet and exercise, prostate cancer screening discussed including benefit of early detection, potential need for follow-up, and harms associated with additional management. Patient agrees to screening.    Nutrition, physical activity, healthy weight,ways to reduce stress, adequate sleep, injury prevention, misuse of tobacco, alcohol and drugs, sexual behavior and STD's, dental health, mental health, and immunizations.    Plan of care reviewed with patient at the conclusion of today's visit. Education was provided regarding diagnosis, management and any prescribed or recommended OTC medications.  Patient verbalizes understanding of and agreement with management plan.    Return in about 6 months (around 6/21/2023) for Next scheduled follow up, Labs this visit, Labs next visit.    Dictated Utilizing Dragon Dictation      Transcribed from ambient dictation for TARI Leonardo by Josephine Lipscomb.  12/21/22   10:32 EST    Patient or patient representative verbalized consent to the visit recording.  I have personally performed the services described in this document as transcribed by the above individual, and it is both accurate and complete.

## 2022-12-22 LAB
ALBUMIN SERPL-MCNC: 4.5 G/DL (ref 3.5–5.2)
ALBUMIN/GLOB SERPL: 1.7 G/DL
ALP SERPL-CCNC: 75 U/L (ref 39–117)
ALT SERPL W P-5'-P-CCNC: 25 U/L (ref 1–41)
ANION GAP SERPL CALCULATED.3IONS-SCNC: 9 MMOL/L (ref 5–15)
AST SERPL-CCNC: 18 U/L (ref 1–40)
BILIRUB SERPL-MCNC: 0.3 MG/DL (ref 0–1.2)
BUN SERPL-MCNC: 25 MG/DL (ref 8–23)
BUN/CREAT SERPL: 25 (ref 7–25)
CALCIUM SPEC-SCNC: 9 MG/DL (ref 8.6–10.5)
CHLORIDE SERPL-SCNC: 102 MMOL/L (ref 98–107)
CHOLEST SERPL-MCNC: 175 MG/DL (ref 0–200)
CO2 SERPL-SCNC: 27 MMOL/L (ref 22–29)
CREAT SERPL-MCNC: 1 MG/DL (ref 0.76–1.27)
EGFRCR SERPLBLD CKD-EPI 2021: 83 ML/MIN/1.73
GLOBULIN UR ELPH-MCNC: 2.7 GM/DL
GLUCOSE SERPL-MCNC: 108 MG/DL (ref 65–99)
HDLC SERPL-MCNC: 53 MG/DL (ref 40–60)
LDLC SERPL CALC-MCNC: 95 MG/DL (ref 0–100)
LDLC/HDLC SERPL: 1.71 {RATIO}
POTASSIUM SERPL-SCNC: 4.7 MMOL/L (ref 3.5–5.2)
PROT SERPL-MCNC: 7.2 G/DL (ref 6–8.5)
SODIUM SERPL-SCNC: 138 MMOL/L (ref 136–145)
TRIGL SERPL-MCNC: 158 MG/DL (ref 0–150)
TSH SERPL DL<=0.05 MIU/L-ACNC: 2.55 UIU/ML (ref 0.27–4.2)
VLDLC SERPL-MCNC: 27 MG/DL (ref 5–40)

## 2023-01-04 RX ORDER — NEBIVOLOL 5 MG/1
5 TABLET ORAL DAILY
Qty: 90 TABLET | Refills: 1 | Status: SHIPPED | OUTPATIENT
Start: 2023-01-04

## 2023-02-26 PROBLEM — R33.9 URINARY RETENTION: Status: RESOLVED | Noted: 2019-11-22 | Resolved: 2023-02-26

## 2023-04-14 RX ORDER — NEBIVOLOL 5 MG/1
5 TABLET ORAL DAILY
Qty: 90 TABLET | Refills: 1 | Status: SHIPPED | OUTPATIENT
Start: 2023-04-14

## 2023-09-29 ENCOUNTER — OFFICE VISIT (OUTPATIENT)
Dept: CARDIOLOGY | Facility: CLINIC | Age: 68
End: 2023-09-29
Payer: COMMERCIAL

## 2023-09-29 VITALS
DIASTOLIC BLOOD PRESSURE: 78 MMHG | HEART RATE: 56 BPM | BODY MASS INDEX: 28.56 KG/M2 | WEIGHT: 204 LBS | HEIGHT: 71 IN | SYSTOLIC BLOOD PRESSURE: 140 MMHG | OXYGEN SATURATION: 99 %

## 2023-09-29 DIAGNOSIS — I10 ESSENTIAL HYPERTENSION: ICD-10-CM

## 2023-09-29 DIAGNOSIS — E78.2 MIXED HYPERLIPIDEMIA: ICD-10-CM

## 2023-09-29 DIAGNOSIS — I48.0 PAROXYSMAL ATRIAL FIBRILLATION: Primary | ICD-10-CM

## 2023-09-29 RX ORDER — VALSARTAN 160 MG/1
160 TABLET ORAL NIGHTLY
Qty: 90 TABLET | Refills: 3 | Status: SHIPPED | OUTPATIENT
Start: 2023-09-29

## 2023-09-29 NOTE — PROGRESS NOTES
[facility] Cardiology    Encounter Date: 2023    Patient ID: Luis Felipe Siegel is a 68 y.o. male.  : 1955     PCP: Vi Solis APRN       Chief Complaint: Paroxysmal atrial fibrillation      PROBLEM LIST:  Atrial fibrillation  Echo, 2022: EF 60%, TR with RVSP 19mmHg  Stress MPS, 2022: No evidence of ischemia. Perfusion portion revealed inferior myocardiac perfusion defect that was worse with rest imaging prior to attenuation correction and completely absent after correction, suggesting artifact  48h Holter, 2022: SR. Rare APCs and PVCs. Rare SVT up to 14 beats. No Afib. No symptoms.   Hypertension  Asthma    History of Present Illness  Patient presents today for a one year follow-up with a history of PAF and cardiac risk factors. Since last visit, patient has been doing well overall from a cardiovascular standpoint. He states after his urologist questioned the nature of his atrial fibrillation patient Used his Apple Watch that raised a concern that he may be in atrial fibrillation however he printed the strips and these showed sinus rhythm with occasional APC.  Patient has not experienced any palpitations at all and feels well in general.    Patient monitors his blood pressure regularly at home. He notes that his blood pressure is elevated prior to exertion but is low following activities such as walking and playing golf.  Patient denies chest pain, shortness of breath, orthopnea, palpitations, edema, dizziness, and syncope.     No Known Allergies      Current Outpatient Medications:     albuterol sulfate  (90 Base) MCG/ACT inhaler, USE 2 INHALATIONS BY MOUTH  EVERY 4 HOURS AS NEEDED FOR COUGH WHEEZING, SHORTNESS  OF BREATH, Disp: 25.5 g, Rfl: 6    aspirin 81 MG chewable tablet, Chew 1 tablet Daily., Disp: , Rfl:     nebivolol (Bystolic) 5 MG tablet, Take 1 tablet by mouth Daily., Disp: 90 tablet, Rfl: 1    valsartan (DIOVAN) 160 MG tablet,  "Take 1 tablet by mouth Every Night., Disp: 90 tablet, Rfl: 3    The following portions of the patient's history were reviewed and updated as appropriate: allergies, current medications, past family history, past medical history, past social history, past surgical history and problem list.    ROS  Review of Systems   14 point ROS negative except for that listed in the HPI.         Objective:     /78 (BP Location: Right arm, Patient Position: Sitting)   Pulse 56   Ht 180.3 cm (71\")   Wt 92.5 kg (204 lb)   SpO2 99%   BMI 28.45 kg/m²    156/80 mmHg during visit  Physical Exam  Constitutional: Patient appears well-developed and well-nourished.   HENT: HEENT exam unremarkable.   Neck: Neck supple. No JVD present. No carotid bruits.   Cardiovascular: Normal rate, regular rhythm and normal heart sounds. No murmur heard.   2+ symmetric pulses.   Pulmonary/Chest: Breath sounds normal. Does not exhibit tenderness.   Abdominal: Abdomen benign.   Musculoskeletal: Does not exhibit edema.   Neurological: Neurological exam unremarkable.   Vitals reviewed.    Data Review:   Lab Results   Component Value Date    GLUCOSE 96 06/28/2023    BUN 21 06/28/2023    CREATININE 1.00 06/28/2023    EGFR 82.5 06/28/2023    BCR 21.0 06/28/2023     06/28/2023    K 4.2 06/28/2023    CO2 21.4 (L) 06/28/2023    CALCIUM 8.9 06/28/2023    ALBUMIN 4.4 06/28/2023    AST 20 06/28/2023    ALT 15 06/28/2023     Lab Results   Component Value Date    CHOL 155 06/28/2023    TRIG 171 (H) 06/28/2023    HDL 51 06/28/2023    LDL 75 06/28/2023      Lab Results   Component Value Date    WBC 5.77 12/21/2022    RBC 4.44 12/21/2022    HGB 14.5 12/21/2022    HCT 41.8 12/21/2022    MCV 94.1 12/21/2022     12/21/2022     Lab Results   Component Value Date    TSH 2.550 12/21/2022          ECG 12 Lead    Date/Time: 9/29/2023 9:12 AM  Performed by: Radha Benitez MD  Authorized by: Radha Benitez MD   Comparison: compared with previous ECG from " 2/25/2022  Comparison to previous ECG: SB  Rhythm: sinus bradycardia  BPM: 56    Clinical impression: normal ECG           Assessment:      Diagnosis Plan   1. Paroxysmal atrial fibrillation, no recurrence Stable and asymptomatic.  Continue sinus rhythm.  I have personally reviewed the ECG today and the Apple Watch rhythm strips all of these show sinus rhythm.  Continue nebivolol and aspirin.   2. Essential hypertension  Blood pressure was elevated, he has noted occasional elevations at home as well.  We will add losartan 160 mg nightly.  Patient will monitor his blood pressure at home with goal of average 130 or less systolic.    3. Mixed hyperlipidemia  Well controlled with diet and exercise. Last HDL/LDL was 51/75 on 06/28/2023.     Plan:   Stable cardiac status.  No angina or CHF symptoms, no recurrence of atrial fibrillation.  Start on valsartan 160 mg nightly better control of blood pressure.   Continue to monitor blood pressure with a systolic goal of <130 mmHg systolic.  Heart healthy diet and regular exercise encouraged.  Continue all other current medications.   FU in 12 MO, sooner as needed.  Thank you for allowing us to participate in the care of your patient.       Scribed for Radha Benitez MD by Zulma Lara. 9/29/2023 09:05 EDT    I, Radha Benitez MD, personally performed the services described in this documentation as scribed by the above named individual in my presence, and it is both accurate and complete.  9/29/2023  17:04 EDT      Please note that portions of this note may have been completed with a voice recognition program. Efforts were made to edit the dictations, but occasionally words are mistranscribed.

## 2023-12-26 RX ORDER — NEBIVOLOL 5 MG/1
5 TABLET ORAL DAILY
Qty: 90 TABLET | Refills: 1 | Status: SHIPPED | OUTPATIENT
Start: 2023-12-26

## 2024-01-02 DIAGNOSIS — R73.01 IMPAIRED FASTING GLUCOSE: ICD-10-CM

## 2024-01-02 DIAGNOSIS — E78.2 MIXED HYPERLIPIDEMIA: ICD-10-CM

## 2024-01-02 DIAGNOSIS — I10 ESSENTIAL HYPERTENSION: Primary | ICD-10-CM

## 2024-01-02 NOTE — PROGRESS NOTES
Luis Felipe Siegel  1955  [id number]  Patient Care Team:  Vi Solis APRN as PCP - General (Internal Medicine)  Moe Santacruz MD as Consulting Physician (Urology)  Radha Benitez MD as Consulting Physician (Cardiology)    Luis Felipe Siegel is a 68 y.o. pleasant male here today for annual physical.  Chief Complaint   Patient presents with    Annual Exam       HPI:   Luis Felipe Siegel is a pleasant 67 y.o. male who has a pertinent medical history of asthma, A. Fib (asa therapy alone), hyperlipidemia, and hypertension. The patient presents today for a physical. He had a normal PSA this summer and now follows with urology. His colonoscopy is not due until 2026. His blood pressure here today is a little high.     His blood pressure has been labile lately but was normal limits during the summer months. This was intermittently checked and noted to be within 110s and 120s mmHg systolic. Two days ago, he notes his blood pressure readings at 95 to 105 mmHg systolic. He followed with his cardiologist, Dr. Benitez, on 09/2023. At this visit, his blood pressure was noted to be in the 150s mmHg systolic. Subsequently, he was started back on valsartan 160 mg once daily. He did not take the valsartan right away. However, in early 11/2023, his blood pressure began to elevate at 140s mmHg systolic consistently. This typically occurred during the morning. He took the valsartan during the mornings with onset of decreased blood pressure readings at 80s mmHg systolic. Subsequently, he switched to the nighttime dose and noticed normal blood pressure readings. He does walk 2.25 miles at least twice per week, approximately 8:00 AM.  His blood pressure is typically 130 mmHg systolic in the morning before he walks and 90 to 95 mmHg systolic after walks. He is asymptomatic with  low blood pressure readings. Over the past two weeks, his blood pressure has been as low as 85 mmHg systolic while standing. He denies any dizziness at  that time. One week ago, during a game of golf, he became symptomatic with blurred vision and lightheadedness and suspected low bp. His symptoms did resolve after drinking water an hour later. After this episode, he began tapering valsartan to half a tablet last week for about 4 to 5 days. His blood pressure improved with this change with readings at 130 mmHg systolic in the mornings. The patient reports that after he walked, his blood pressure was in the 90s mmHg systolic. Yesterday, 01/02/2024, was the first day he had worked since he had done that again. He did not check his blood pressure in the morning. He did walk a mile at lunch, with blood pressure readings  at 120 mmHg systolic. After he stopped working, it was 160 mmHg systolic. He waited an hour and checked it again and it was 125/70 mmHg. He is repeat blood pressure was 160 mmHg systolic again. This morning, 01/03/2024, it was 133/75 mmHg before he left home.  He wonders if he should be on something other than the valsartan. He has concerns with consistent readings in the 140s mmHg systolic. He is compliant with well hydration.     He denies any changes in daily use of albuterol. Some days he knows that the cats have been lying on the bed , which affect his breathing. This typically triggers use of albuterol inhaler. The patient reports that 1 inhaler lasts him for a long time.     He denies any atrial fibrillation, lower extremity edema, shortness of breath, or short lived episodes where he felt like he was out of rhythm.    He does not want a shingles vaccine today as he is going out of town, does plan to get. He is up to date on his COVID vaccination.    The patient denies any problems with constipation, diarrhea, or abdominal pain.    Past Medical History:   Diagnosis Date    Abnormal ECG 12/22/2021    New Afib    Arrhythmia 12/22/2021    Afib    Asthma     Atrial fibrillation     Bronchospasm      to Animal Dander    Coronary artery disease  "12/21/2021    Transient atrial fibrillation    COVID-19 08/2022    Hyperlipidemia     Hypertension     Positive PPD     in 1983    Ureteral disorder     Urinary retention 11/22/2019     Past Surgical History:   Procedure Laterality Date    COLONOSCOPY  12/2021    NO PAST SURGERIES       Family History   Problem Relation Age of Onset    COPD Mother     Cancer Mother         lung?    Atrial fibrillation Father     Hypertension Father     Arrhythmia Father     No Known Problems Brother     Hypertension Maternal Grandmother     Hypertension Maternal Grandfather     No Known Problems Paternal Grandmother     Prostate cancer Paternal Grandfather      Social History     Tobacco Use   Smoking Status Never   Smokeless Tobacco Never     No Known Allergies    Current Outpatient Medications:     albuterol sulfate  (90 Base) MCG/ACT inhaler, Inhale 2 puffs 4 (Four) Times a Day., Disp: 25.5 g, Rfl: 6    aspirin 81 MG chewable tablet, Chew 1 tablet Daily., Disp: , Rfl:     nebivolol (BYSTOLIC) 5 MG tablet, Take 1 tablet by mouth Daily., Disp: 90 tablet, Rfl: 1    valsartan (DIOVAN) 160 MG tablet, Take 1 tablet by mouth Every Night., Disp: 90 tablet, Rfl: 3    Review of Systems    /90   Pulse 60   Temp 96.9 °F (36.1 °C) (Temporal)   Ht 181 cm (71.26\")   Wt 93.9 kg (207 lb)   BMI 28.66 kg/m²     Physical Exam    Results Review:  None    PHQ-9 Total Score: 0          Assessment/Plan:  Diagnoses and all orders for this visit:    1. Routine medical exam (Primary)    2. Paroxysmal atrial fibrillation    3. Mixed hyperlipidemia    4. Essential hypertension    5. Overweight (BMI 25.0-29.9)    6. Bronchospasm    Other orders  -     albuterol sulfate  (90 Base) MCG/ACT inhaler; Inhale 2 puffs 4 (Four) Times a Day.  Dispense: 25.5 g; Refill: 6  -     nebivolol (BYSTOLIC) 5 MG tablet; Take 1 tablet by mouth Daily.  Dispense: 90 tablet; Refill: 1     1. Hypertension  Comments:  The patient will resume his valsartan at " 160 mg daily at night.  If he becomes symptomatic  or has consistent low bp with this medication increase, he is advised to decrease the valsartan 80 mg daily.  He will continue to monitor his blood pressure at home.  He will send me an email in 1 to 2 weeks to let me know what the blood pressure is.    2. Cat dander allergy  Comments:  The patient will continue to use his albuterol inhaler as needed.  A refill for albuterol inhaler has been sent to his local pharmacy.    3. Health maintenance  Comments:  He is advised to follow-up for RSV and shingles vaccination at his local pharmacy.      There are no Patient Instructions on file for this visit.    Counseling/Anticipatory Guidance:   Plan of care reviewed with patient at the conclusion of today's visit. Education was provided in regards to diagnosis, diet and exercise, prostate cancer screening discussed including benefit of early detection, potential need for follow-up, and harms associated with additional management. Patient agrees to screening.    Nutrition, physical activity, healthy weight,ways to reduce stress, adequate sleep, injury prevention, misuse of tobacco, alcohol and drugs, sexual behavior and STD's, dental health, mental health, and immunizations.    Plan of care reviewed with patient at the conclusion of today's visit. Education was provided regarding diagnosis, management and any prescribed or recommended OTC medications.  Patient verbalizes understanding of and agreement with management plan.    Return in about 6 months (around 7/3/2024).    Dictated Utilizing Dragon Dictation      TARI Leonardo    Transcribed from ambient dictation for TARI Leonardo by Selam Costello  01/03/24   13:34 EST    Patient or patient representative verbalized consent to the visit recording.  I have personally performed the services described in this document as transcribed by the above individual, and it is both accurate and complete.

## 2024-01-03 ENCOUNTER — OFFICE VISIT (OUTPATIENT)
Dept: INTERNAL MEDICINE | Facility: CLINIC | Age: 69
End: 2024-01-03
Payer: COMMERCIAL

## 2024-01-03 ENCOUNTER — LAB (OUTPATIENT)
Dept: LAB | Facility: HOSPITAL | Age: 69
End: 2024-01-03
Payer: COMMERCIAL

## 2024-01-03 VITALS
WEIGHT: 207 LBS | BODY MASS INDEX: 28.98 KG/M2 | HEART RATE: 60 BPM | HEIGHT: 71 IN | DIASTOLIC BLOOD PRESSURE: 90 MMHG | TEMPERATURE: 96.9 F | SYSTOLIC BLOOD PRESSURE: 160 MMHG

## 2024-01-03 DIAGNOSIS — I10 ESSENTIAL HYPERTENSION: ICD-10-CM

## 2024-01-03 DIAGNOSIS — E66.3 OVERWEIGHT (BMI 25.0-29.9): ICD-10-CM

## 2024-01-03 DIAGNOSIS — R73.01 IMPAIRED FASTING GLUCOSE: ICD-10-CM

## 2024-01-03 DIAGNOSIS — I48.0 PAROXYSMAL ATRIAL FIBRILLATION: ICD-10-CM

## 2024-01-03 DIAGNOSIS — J98.01 BRONCHOSPASM: ICD-10-CM

## 2024-01-03 DIAGNOSIS — E78.2 MIXED HYPERLIPIDEMIA: ICD-10-CM

## 2024-01-03 DIAGNOSIS — Z00.00 ROUTINE MEDICAL EXAM: Primary | ICD-10-CM

## 2024-01-03 LAB
ALBUMIN SERPL-MCNC: 4.6 G/DL (ref 3.5–5.2)
ALBUMIN UR-MCNC: 6.1 MG/DL
ALBUMIN/GLOB SERPL: 1.9 G/DL
ALP SERPL-CCNC: 68 U/L (ref 39–117)
ALT SERPL W P-5'-P-CCNC: 18 U/L (ref 1–41)
ANION GAP SERPL CALCULATED.3IONS-SCNC: 10 MMOL/L (ref 5–15)
AST SERPL-CCNC: 15 U/L (ref 1–40)
BASOPHILS # BLD AUTO: 0.06 10*3/MM3 (ref 0–0.2)
BASOPHILS NFR BLD AUTO: 1 % (ref 0–1.5)
BILIRUB SERPL-MCNC: 0.4 MG/DL (ref 0–1.2)
BUN SERPL-MCNC: 25 MG/DL (ref 8–23)
BUN/CREAT SERPL: 21.9 (ref 7–25)
CALCIUM SPEC-SCNC: 8.9 MG/DL (ref 8.6–10.5)
CHLORIDE SERPL-SCNC: 108 MMOL/L (ref 98–107)
CHOLEST SERPL-MCNC: 182 MG/DL (ref 0–200)
CO2 SERPL-SCNC: 24 MMOL/L (ref 22–29)
CREAT SERPL-MCNC: 1.14 MG/DL (ref 0.76–1.27)
CREAT UR-MCNC: 182.1 MG/DL
DEPRECATED RDW RBC AUTO: 47.1 FL (ref 37–54)
EGFRCR SERPLBLD CKD-EPI 2021: 70.5 ML/MIN/1.73
EOSINOPHIL # BLD AUTO: 0.18 10*3/MM3 (ref 0–0.4)
EOSINOPHIL NFR BLD AUTO: 3 % (ref 0.3–6.2)
ERYTHROCYTE [DISTWIDTH] IN BLOOD BY AUTOMATED COUNT: 13 % (ref 12.3–15.4)
GLOBULIN UR ELPH-MCNC: 2.4 GM/DL
GLUCOSE SERPL-MCNC: 108 MG/DL (ref 65–99)
HCT VFR BLD AUTO: 41.9 % (ref 37.5–51)
HDLC SERPL-MCNC: 52 MG/DL (ref 40–60)
HGB BLD-MCNC: 14 G/DL (ref 13–17.7)
IMM GRANULOCYTES # BLD AUTO: 0.02 10*3/MM3 (ref 0–0.05)
IMM GRANULOCYTES NFR BLD AUTO: 0.3 % (ref 0–0.5)
LDLC SERPL CALC-MCNC: 86 MG/DL (ref 0–100)
LDLC/HDLC SERPL: 1.48 {RATIO}
LYMPHOCYTES # BLD AUTO: 1.33 10*3/MM3 (ref 0.7–3.1)
LYMPHOCYTES NFR BLD AUTO: 22.3 % (ref 19.6–45.3)
MCH RBC QN AUTO: 32.9 PG (ref 26.6–33)
MCHC RBC AUTO-ENTMCNC: 33.4 G/DL (ref 31.5–35.7)
MCV RBC AUTO: 98.6 FL (ref 79–97)
MICROALBUMIN/CREAT UR: 33.5 MG/G (ref 0–29)
MONOCYTES # BLD AUTO: 0.51 10*3/MM3 (ref 0.1–0.9)
MONOCYTES NFR BLD AUTO: 8.5 % (ref 5–12)
NEUTROPHILS NFR BLD AUTO: 3.87 10*3/MM3 (ref 1.7–7)
NEUTROPHILS NFR BLD AUTO: 64.9 % (ref 42.7–76)
NRBC BLD AUTO-RTO: 0 /100 WBC (ref 0–0.2)
PLATELET # BLD AUTO: 216 10*3/MM3 (ref 140–450)
PMV BLD AUTO: 9.7 FL (ref 6–12)
POTASSIUM SERPL-SCNC: 4.3 MMOL/L (ref 3.5–5.2)
PROT SERPL-MCNC: 7 G/DL (ref 6–8.5)
RBC # BLD AUTO: 4.25 10*6/MM3 (ref 4.14–5.8)
SODIUM SERPL-SCNC: 142 MMOL/L (ref 136–145)
TRIGL SERPL-MCNC: 264 MG/DL (ref 0–150)
TSH SERPL DL<=0.05 MIU/L-ACNC: 4.16 UIU/ML (ref 0.27–4.2)
VLDLC SERPL-MCNC: 44 MG/DL (ref 5–40)
WBC NRBC COR # BLD AUTO: 5.97 10*3/MM3 (ref 3.4–10.8)

## 2024-01-03 PROCEDURE — 83036 HEMOGLOBIN GLYCOSYLATED A1C: CPT

## 2024-01-03 PROCEDURE — 80061 LIPID PANEL: CPT

## 2024-01-03 PROCEDURE — 82570 ASSAY OF URINE CREATININE: CPT

## 2024-01-03 PROCEDURE — 82043 UR ALBUMIN QUANTITATIVE: CPT

## 2024-01-03 PROCEDURE — 85025 COMPLETE CBC W/AUTO DIFF WBC: CPT

## 2024-01-03 PROCEDURE — 80053 COMPREHEN METABOLIC PANEL: CPT

## 2024-01-03 PROCEDURE — 84443 ASSAY THYROID STIM HORMONE: CPT

## 2024-01-03 RX ORDER — ALBUTEROL SULFATE 90 UG/1
2 AEROSOL, METERED RESPIRATORY (INHALATION)
Qty: 25.5 G | Refills: 6 | Status: SHIPPED | OUTPATIENT
Start: 2024-01-03

## 2024-01-03 RX ORDER — NEBIVOLOL 5 MG/1
5 TABLET ORAL DAILY
Qty: 90 TABLET | Refills: 1 | Status: SHIPPED | OUTPATIENT
Start: 2024-01-03

## 2024-01-04 LAB — HBA1C MFR BLD: 5.7 % (ref 4.8–5.6)

## 2024-01-18 ENCOUNTER — APPOINTMENT (OUTPATIENT)
Dept: GENERAL RADIOLOGY | Facility: HOSPITAL | Age: 69
End: 2024-01-18
Payer: COMMERCIAL

## 2024-01-18 ENCOUNTER — HOSPITAL ENCOUNTER (EMERGENCY)
Facility: HOSPITAL | Age: 69
Discharge: HOME | End: 2024-01-18
Attending: EMERGENCY MEDICINE | Admitting: EMERGENCY MEDICINE
Payer: COMMERCIAL

## 2024-01-18 VITALS
WEIGHT: 202 LBS | HEART RATE: 59 BPM | OXYGEN SATURATION: 99 % | HEIGHT: 71 IN | TEMPERATURE: 97.8 F | DIASTOLIC BLOOD PRESSURE: 89 MMHG | BODY MASS INDEX: 28.28 KG/M2 | RESPIRATION RATE: 20 BRPM | SYSTOLIC BLOOD PRESSURE: 125 MMHG

## 2024-01-18 DIAGNOSIS — I48.92 ATRIAL FLUTTER WITH RAPID VENTRICULAR RESPONSE: Primary | ICD-10-CM

## 2024-01-18 LAB
ALBUMIN SERPL-MCNC: 4.4 G/DL (ref 3.5–5.2)
ALBUMIN/GLOB SERPL: 1.4 G/DL
ALP SERPL-CCNC: 69 U/L (ref 39–117)
ALT SERPL W P-5'-P-CCNC: 16 U/L (ref 1–41)
ANION GAP SERPL CALCULATED.3IONS-SCNC: 11 MMOL/L (ref 5–15)
AST SERPL-CCNC: 21 U/L (ref 1–40)
BASOPHILS # BLD AUTO: 0.07 10*3/MM3 (ref 0–0.2)
BASOPHILS NFR BLD AUTO: 1.1 % (ref 0–1.5)
BILIRUB SERPL-MCNC: 0.6 MG/DL (ref 0–1.2)
BUN SERPL-MCNC: 23 MG/DL (ref 8–23)
BUN/CREAT SERPL: 19.8 (ref 7–25)
CALCIUM SPEC-SCNC: 8.8 MG/DL (ref 8.6–10.5)
CHLORIDE SERPL-SCNC: 103 MMOL/L (ref 98–107)
CO2 SERPL-SCNC: 24 MMOL/L (ref 22–29)
CREAT SERPL-MCNC: 1.16 MG/DL (ref 0.76–1.27)
DEPRECATED RDW RBC AUTO: 46 FL (ref 37–54)
EGFRCR SERPLBLD CKD-EPI 2021: 69 ML/MIN/1.73
EOSINOPHIL # BLD AUTO: 0.15 10*3/MM3 (ref 0–0.4)
EOSINOPHIL NFR BLD AUTO: 2.4 % (ref 0.3–6.2)
ERYTHROCYTE [DISTWIDTH] IN BLOOD BY AUTOMATED COUNT: 12.8 % (ref 12.3–15.4)
GLOBULIN UR ELPH-MCNC: 3.2 GM/DL
GLUCOSE SERPL-MCNC: 111 MG/DL (ref 65–99)
HCT VFR BLD AUTO: 46.7 % (ref 37.5–51)
HGB BLD-MCNC: 16 G/DL (ref 13–17.7)
HOLD SPECIMEN: NORMAL
IMM GRANULOCYTES # BLD AUTO: 0.02 10*3/MM3 (ref 0–0.05)
IMM GRANULOCYTES NFR BLD AUTO: 0.3 % (ref 0–0.5)
LYMPHOCYTES # BLD AUTO: 2.11 10*3/MM3 (ref 0.7–3.1)
LYMPHOCYTES NFR BLD AUTO: 33.4 % (ref 19.6–45.3)
MAGNESIUM SERPL-MCNC: 2.1 MG/DL (ref 1.6–2.4)
MCH RBC QN AUTO: 33.3 PG (ref 26.6–33)
MCHC RBC AUTO-ENTMCNC: 34.3 G/DL (ref 31.5–35.7)
MCV RBC AUTO: 97.1 FL (ref 79–97)
MONOCYTES # BLD AUTO: 0.52 10*3/MM3 (ref 0.1–0.9)
MONOCYTES NFR BLD AUTO: 8.2 % (ref 5–12)
NEUTROPHILS NFR BLD AUTO: 3.44 10*3/MM3 (ref 1.7–7)
NEUTROPHILS NFR BLD AUTO: 54.6 % (ref 42.7–76)
NRBC BLD AUTO-RTO: 0 /100 WBC (ref 0–0.2)
NT-PROBNP SERPL-MCNC: 2169 PG/ML (ref 0–900)
PLATELET # BLD AUTO: 220 10*3/MM3 (ref 140–450)
PMV BLD AUTO: 9.4 FL (ref 6–12)
POTASSIUM SERPL-SCNC: 4.6 MMOL/L (ref 3.5–5.2)
PROT SERPL-MCNC: 7.6 G/DL (ref 6–8.5)
QT INTERVAL: 306 MS
QT INTERVAL: 390 MS
QTC INTERVAL: 382 MS
QTC INTERVAL: 463 MS
RBC # BLD AUTO: 4.81 10*6/MM3 (ref 4.14–5.8)
SODIUM SERPL-SCNC: 138 MMOL/L (ref 136–145)
TROPONIN T SERPL HS-MCNC: 12 NG/L
TSH SERPL DL<=0.05 MIU/L-ACNC: 3.06 UIU/ML (ref 0.27–4.2)
WBC NRBC COR # BLD AUTO: 6.31 10*3/MM3 (ref 3.4–10.8)
WHOLE BLOOD HOLD COAG: NORMAL
WHOLE BLOOD HOLD SPECIMEN: NORMAL

## 2024-01-18 PROCEDURE — 83735 ASSAY OF MAGNESIUM: CPT | Performed by: EMERGENCY MEDICINE

## 2024-01-18 PROCEDURE — 99284 EMERGENCY DEPT VISIT MOD MDM: CPT

## 2024-01-18 PROCEDURE — 80053 COMPREHEN METABOLIC PANEL: CPT | Performed by: EMERGENCY MEDICINE

## 2024-01-18 PROCEDURE — 93005 ELECTROCARDIOGRAM TRACING: CPT | Performed by: EMERGENCY MEDICINE

## 2024-01-18 PROCEDURE — 83880 ASSAY OF NATRIURETIC PEPTIDE: CPT | Performed by: EMERGENCY MEDICINE

## 2024-01-18 PROCEDURE — 71045 X-RAY EXAM CHEST 1 VIEW: CPT

## 2024-01-18 PROCEDURE — 84484 ASSAY OF TROPONIN QUANT: CPT | Performed by: EMERGENCY MEDICINE

## 2024-01-18 PROCEDURE — 93005 ELECTROCARDIOGRAM TRACING: CPT

## 2024-01-18 PROCEDURE — 85025 COMPLETE CBC W/AUTO DIFF WBC: CPT | Performed by: EMERGENCY MEDICINE

## 2024-01-18 PROCEDURE — 84443 ASSAY THYROID STIM HORMONE: CPT | Performed by: EMERGENCY MEDICINE

## 2024-01-18 RX ORDER — SODIUM CHLORIDE 0.9 % (FLUSH) 0.9 %
10 SYRINGE (ML) INJECTION AS NEEDED
Status: DISCONTINUED | OUTPATIENT
Start: 2024-01-18 | End: 2024-01-18 | Stop reason: HOSPADM

## 2024-01-18 NOTE — ED PROVIDER NOTES
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  He states that he does have a distant history of atrial fibrillation but has not had an episode for years.  He was in his normal state of health on the developed this a vague sensation this morning.  He took his vital signs and noted that his heart rate was in the 90s.  Is very unusual for him.  His heart rate is typically between 55 and 60 bpm.  With the elevated heart rates in the vague fatigue sensation he had called his cardiologist and came to the emergency department for evaluation.  Patient sees Dr. Benitez, cardiology and is not currently anticoagulated.  Denies fever, chills, chest pain, difficulty breathing, abdominal pain, vomiting, diarrhea, or other acute complaints.      Review of Systems   All other systems reviewed and are negative.      Past Medical History:   Diagnosis Date    Abnormal ECG 12/22/2021    New Afib    Arrhythmia 12/22/2021    Afib    Asthma     Atrial fibrillation     Bronchospasm      to Animal Dander    Coronary artery disease 12/21/2021    Transient atrial fibrillation    COVID-19 08/2022    Hyperlipidemia     Hypertension     Positive PPD     in 1983    Ureteral disorder     Urinary retention 11/22/2019       No Known Allergies    Past Surgical History:   Procedure Laterality Date    COLONOSCOPY  12/2021    NO PAST SURGERIES         Family History   Problem Relation Age of Onset    COPD Mother     Cancer Mother         lung?    Atrial fibrillation Father     Hypertension Father     Arrhythmia Father     No Known Problems Brother     Hypertension Maternal Grandmother     Hypertension Maternal Grandfather     No Known Problems Paternal Grandmother     Prostate cancer Paternal Grandfather        Social History     Socioeconomic History    Marital status:    Tobacco Use    Smoking status: Never     Smokeless tobacco: Never   Vaping Use    Vaping Use: Never used    Passive vaping exposure: Yes   Substance and Sexual Activity    Alcohol use: Yes     Alcohol/week: 7.0 - 14.0 standard drinks of alcohol     Types: 7 - 14 Cans of beer per week     Comment: weekly    Drug use: Never    Sexual activity: Yes     Partners: Female     Birth control/protection: Post-menopausal           Objective   Physical Exam  Vitals and nursing note reviewed.   Constitutional:       General: He is not in acute distress.  HENT:      Head: Normocephalic and atraumatic.   Eyes:      Conjunctiva/sclera: Conjunctivae normal.      Pupils: Pupils are equal, round, and reactive to light.   Neck:      Thyroid: No thyromegaly.   Cardiovascular:      Rate and Rhythm: Normal rate and regular rhythm.      Heart sounds: Normal heart sounds. No murmur heard.     No friction rub. No gallop.   Pulmonary:      Effort: Pulmonary effort is normal. No respiratory distress.      Breath sounds: Normal breath sounds.   Abdominal:      General: Bowel sounds are normal.      Palpations: Abdomen is soft.      Tenderness: There is no abdominal tenderness.   Musculoskeletal:         General: Normal range of motion.      Cervical back: Normal range of motion and neck supple.   Lymphadenopathy:      Cervical: No cervical adenopathy.   Skin:     General: Skin is warm and dry.   Neurological:      Mental Status: He is alert and oriented to person, place, and time.         Procedures           ED Course  ED Course as of 01/19/24 0603   Thu Jan 18, 2024   0956 Patient spontaneously converted back to normal sinus rhythm at a rate of 60 bpm prior to my examination.  He converted after arrival to the emergency department but again prior to my exam.  Examination is completely normal.  We will wait for the remainder of his laboratory work to result and I will touch base with Dr. Villarreal regarding additional recommendations and follow-up. [CP]   1041 Laboratory workup  reassuring.  Dr. Villarreal pagenanci [CP]      ED Course User Index  [CP] Steve Fernández DO                                             Medical Decision Making  Case discussed with Dr. Benitez, cardiology.  Pt will be started on Eliquis.  No additional medications recommended at this time.    Problems Addressed:  Atrial flutter with rapid ventricular response: complicated acute illness or injury    Amount and/or Complexity of Data Reviewed  External Data Reviewed: notes.  Labs: ordered. Decision-making details documented in ED Course.  Radiology: ordered and independent interpretation performed. Decision-making details documented in ED Course.  ECG/medicine tests: ordered and independent interpretation performed. Decision-making details documented in ED Course.    Risk  Prescription drug management.        Final diagnoses:   Atrial flutter with rapid ventricular response       ED Disposition  ED Disposition       ED Disposition   Discharge    Condition   Stable    Comment   --           DISCHARGE    Patient discharged in stable condition.    Reviewed implications of results, diagnosis, meds, responsibility to follow up, warning signs and symptoms of possible worsening, potential complications and reasons to return to ER.    Patient/Family voiced understanding of above instructions.    Discussed plan for discharge, as there is no emergent indication for admission.  Pt/family is agreeable and understands need for follow up and possible repeat testing.  Pt/family is aware that discharge does not mean that nothing is wrong but that it indicates no emergency is currently present that requires admission and they must continue care with follow-up as given below or with a physician of their choice.     FOLLOW-UP  Radha Benitez MD  [address]  [facility] [address]  [phone]    Schedule an appointment as soon as possible for a visit       Vi Solis, APRN  [address]  [address]  [facility]  [address]  [phone]    Schedule an appointment as soon as possible for a visit       [facility] EMERGENCY DEPARTMENT  [address]  [facility] [address] [phone]    If symptoms worsen         Medication List        New Prescriptions      apixaban 5 MG tablet tablet  Commonly known as: ELIQUIS  Take 1 tablet by mouth Every 12 (Twelve) Hours.               Where to Get Your Medications        These medications were sent to [facility] [id number] - [address] - [phone]  - [phone] [address] [address]      Phone: [phone]   apixaban 5 MG tablet tablet            Steve Fernández DO  01/19/24 0619

## 2024-01-19 DIAGNOSIS — I48.0 PAROXYSMAL ATRIAL FIBRILLATION: Primary | ICD-10-CM

## 2024-02-12 ENCOUNTER — TELEPHONE (OUTPATIENT)
Dept: CARDIOLOGY | Facility: CLINIC | Age: 69
End: 2024-02-12
Payer: COMMERCIAL

## 2024-02-22 NOTE — PROGRESS NOTES
[facility]    Encounter Date: 2024    Patient ID: Luis Felipe Siegel is a 68 y.o. male.  : 1955     PCP: iV Solis APRN       Chief Complaint: Paroxysmal atrial fibrillation      PROBLEM LIST:  Atrial fibrillation  Echo, 2022: EF 60%, TR with RVSP 19mmHg  Stress MPS, 2022: No evidence of ischemia. Perfusion portion revealed inferior myocardiac perfusion defect that was worse with rest imaging prior to attenuation correction and completely absent after correction, suggesting artifact  48h Holter, 2022: SR. Rare APCs and PVCs. Rare SVT up to 14 beats. No Afib. No symptoms.   Holter, 2024: SR. Occasional PACs. Rare SVT/PAT up to 20 beats. No events.   Hypertension  Asthma    History of Present Illness  Patient presents today for a hospital follow-up with a history of PAF and cardiac risk factors. Since last visit, the patient had an episode of tachycardia which he also noted on his blood pressure monitor, he thought he was in atrial fibrillation and went to the ER.  EKG in the emergency department revealed atrial flutter with 2-1 block at a heart rate of 150s.  He spontaneously converted to sinus rhythm and was advised to start Eliquis.  Since then he has done well.  He monitors his blood pressure at home.  States that with the addition of Diovan 160 his blood pressure was too low and he has cut it back to 80 mg daily and blood pressure mostly runs normal in 120s or less.  He asked in case of recurrent atrial fibrillation does he need to go to the emergency room or this can be managed at home.    No Known Allergies      Current Outpatient Medications:     albuterol sulfate  (90 Base) MCG/ACT inhaler, Inhale 2 puffs 4 (Four) Times a Day., Disp: 25.5 g, Rfl: 6    apixaban (ELIQUIS) 5 MG tablet tablet, Take 1 tablet by mouth Every 12 (Twelve) Hours., Disp: 60 tablet, Rfl: 4    nebivolol (BYSTOLIC) 5 MG tablet, Take 1 tablet by  "mouth Daily., Disp: 90 tablet, Rfl: 1    valsartan (DIOVAN) 160 MG tablet, Take 1 tablet by mouth Every Night. (Patient taking differently: Take 0.5 tablets by mouth Every Night.), Disp: 90 tablet, Rfl: 3    The following portions of the patient's history were reviewed and updated as appropriate: allergies, current medications, past family history, past medical history, past social history, past surgical history and problem list.    ROS  Review of Systems   14 point ROS negative except for that listed in the HPI.         Objective:     /70 (BP Location: Left arm, Patient Position: Sitting, Cuff Size: Adult)   Pulse 61   Ht 180.3 cm (71\")   Wt 94.3 kg (207 lb 12.8 oz)   SpO2 98%   BMI 28.98 kg/m²      Physical Exam  Constitutional: Patient appears well-developed and well-nourished.   HENT: HEENT exam unremarkable.   Neck: Neck supple. No JVD present. No carotid bruits.   Cardiovascular: Normal rate, regular rhythm and normal heart sounds. No murmur heard.   2+ symmetric pulses.   Pulmonary/Chest: Breath sounds normal. Does not exhibit tenderness.   Abdominal: Abdomen benign.   Musculoskeletal: Does not exhibit edema.   Neurological: Neurological exam unremarkable.   Vitals reviewed.    Data Review:   Lab Results   Component Value Date    GLUCOSE 111 (H) 01/18/2024    BUN 23 01/18/2024    CREATININE 1.16 01/18/2024    EGFR 69.0 01/18/2024    BCR 19.8 01/18/2024     01/18/2024    K 4.6 01/18/2024    CO2 24.0 01/18/2024    CALCIUM 8.8 01/18/2024    ALBUMIN 4.4 01/18/2024    AST 21 01/18/2024    ALT 16 01/18/2024     Lab Results   Component Value Date    CHOL 182 01/03/2024    TRIG 264 (H) 01/03/2024    HDL 52 01/03/2024    LDL 86 01/03/2024      Lab Results   Component Value Date    WBC 6.31 01/18/2024    RBC 4.81 01/18/2024    HGB 16.0 01/18/2024    HCT 46.7 01/18/2024    MCV 97.1 (H) 01/18/2024     01/18/2024     Lab Results   Component Value Date    TSH 3.060 01/18/2024     Lab Results "   Component Value Date    HGBA1C 5.70 (H) 01/03/2024        Procedures       Advance Care Planning   ACP discussion was declined by the patient. Patient does not have an advance directive, declines further assistance.           Assessment:      Diagnosis Plan   1. Paroxysmal atrial fibrillation  Currently in sinus rhythm, continue nebivolol for rate control, continue Eliquis for anticoagulation.      2. Essential hypertension  Well-controlled, continue Diovan as prescribed, he is currently taking 80 mg daily but monitor his blood pressure and knows to take the whole tablet if needed.       3. Mixed hyperlipidemia  Elevated triglyceride level, patient does not wish to take any statin or any other lipid-lowering drug therapy and wishes to manage this with diet and exercise.        Plan:   Stable cardiac status.   No significant recurrences of atrial fibrillation.  Discussed that in case of recurrent atrial fibrillation he can always take extra nebivolol and in case of persistent atrial fibrillation he can contact our office for further directions.  In the absence of significant recurrences we do not see the need for antiarrhythmic therapy or ablation at this time.  Regarding elevated triglycerides he does not wish to take any medications, recommended strict diet and exercise with follow-up with PCP.  Continue current medications.   FU in as scheduled sooner as needed.  Thank you for allowing us to participate in the care of your patient.     Radha Benitez MD, FACC, [facility]        Please note that portions of this note may have been completed with a voice recognition program. Efforts were made to edit the dictations, but occasionally words are mistranscribed.

## 2024-02-23 ENCOUNTER — OFFICE VISIT (OUTPATIENT)
Dept: CARDIOLOGY | Facility: CLINIC | Age: 69
End: 2024-02-23
Payer: COMMERCIAL

## 2024-02-23 VITALS
HEART RATE: 61 BPM | BODY MASS INDEX: 29.09 KG/M2 | WEIGHT: 207.8 LBS | OXYGEN SATURATION: 98 % | SYSTOLIC BLOOD PRESSURE: 118 MMHG | DIASTOLIC BLOOD PRESSURE: 70 MMHG | HEIGHT: 71 IN

## 2024-02-23 DIAGNOSIS — I48.0 PAROXYSMAL ATRIAL FIBRILLATION: Primary | ICD-10-CM

## 2024-02-23 DIAGNOSIS — I10 ESSENTIAL HYPERTENSION: ICD-10-CM

## 2024-02-23 DIAGNOSIS — E78.2 MIXED HYPERLIPIDEMIA: ICD-10-CM

## 2024-06-24 RX ORDER — NEBIVOLOL 5 MG/1
5 TABLET ORAL DAILY
Qty: 90 TABLET | Refills: 3 | Status: SHIPPED | OUTPATIENT
Start: 2024-06-24

## 2024-07-15 RX ORDER — APIXABAN 5 MG/1
5 TABLET, FILM COATED ORAL EVERY 12 HOURS
Qty: 60 TABLET | Refills: 4 | Status: SHIPPED | OUTPATIENT
Start: 2024-07-15

## 2024-07-22 NOTE — TELEPHONE ENCOUNTER
Called patient with echo and stress test results.  Echo was within normal limits.  Stress test was negative for ischemia.  He is scheduled for home sleep study and has a follow-up with Dr. Benitez on 2/25.  Patient to keep these appointments.  He reports he has had no further A. fib.  He had no further questions or concerns   Ipledge Number (Optional): 123 Anticipated Starting Dosage (Optional): 40mg Daily Detail Level: Zone

## 2024-07-24 ENCOUNTER — LAB (OUTPATIENT)
Dept: LAB | Facility: HOSPITAL | Age: 69
End: 2024-07-24
Payer: COMMERCIAL

## 2024-07-24 ENCOUNTER — OFFICE VISIT (OUTPATIENT)
Dept: INTERNAL MEDICINE | Facility: CLINIC | Age: 69
End: 2024-07-24
Payer: COMMERCIAL

## 2024-07-24 VITALS
WEIGHT: 202 LBS | DIASTOLIC BLOOD PRESSURE: 88 MMHG | TEMPERATURE: 98.4 F | BODY MASS INDEX: 28.28 KG/M2 | HEIGHT: 71 IN | SYSTOLIC BLOOD PRESSURE: 170 MMHG | HEART RATE: 68 BPM

## 2024-07-24 DIAGNOSIS — E78.2 MIXED HYPERLIPIDEMIA: ICD-10-CM

## 2024-07-24 DIAGNOSIS — I10 ESSENTIAL HYPERTENSION: Primary | ICD-10-CM

## 2024-07-24 DIAGNOSIS — I48.0 PAROXYSMAL ATRIAL FIBRILLATION: ICD-10-CM

## 2024-07-24 DIAGNOSIS — Z12.5 SCREENING PSA (PROSTATE SPECIFIC ANTIGEN): ICD-10-CM

## 2024-07-24 DIAGNOSIS — I10 ESSENTIAL HYPERTENSION: ICD-10-CM

## 2024-07-24 LAB
ALBUMIN SERPL-MCNC: 4.2 G/DL (ref 3.5–5.2)
ALBUMIN UR-MCNC: <1.2 MG/DL
ALBUMIN/GLOB SERPL: 1.3 G/DL
ALP SERPL-CCNC: 67 U/L (ref 39–117)
ALT SERPL W P-5'-P-CCNC: 20 U/L (ref 1–41)
ANION GAP SERPL CALCULATED.3IONS-SCNC: 9 MMOL/L (ref 5–15)
AST SERPL-CCNC: 19 U/L (ref 1–40)
BASOPHILS # BLD AUTO: 0.06 10*3/MM3 (ref 0–0.2)
BASOPHILS NFR BLD AUTO: 0.9 % (ref 0–1.5)
BILIRUB SERPL-MCNC: 0.4 MG/DL (ref 0–1.2)
BUN SERPL-MCNC: 23 MG/DL (ref 8–23)
BUN/CREAT SERPL: 22.8 (ref 7–25)
CALCIUM SPEC-SCNC: 8.9 MG/DL (ref 8.6–10.5)
CHLORIDE SERPL-SCNC: 104 MMOL/L (ref 98–107)
CHOLEST SERPL-MCNC: 152 MG/DL (ref 0–200)
CO2 SERPL-SCNC: 24 MMOL/L (ref 22–29)
CREAT SERPL-MCNC: 1.01 MG/DL (ref 0.76–1.27)
CREAT UR-MCNC: 121.3 MG/DL
DEPRECATED RDW RBC AUTO: 46.2 FL (ref 37–54)
EGFRCR SERPLBLD CKD-EPI 2021: 81 ML/MIN/1.73
EOSINOPHIL # BLD AUTO: 0.05 10*3/MM3 (ref 0–0.4)
EOSINOPHIL NFR BLD AUTO: 0.7 % (ref 0.3–6.2)
ERYTHROCYTE [DISTWIDTH] IN BLOOD BY AUTOMATED COUNT: 13.1 % (ref 12.3–15.4)
GLOBULIN UR ELPH-MCNC: 3.2 GM/DL
GLUCOSE SERPL-MCNC: 109 MG/DL (ref 65–99)
HCT VFR BLD AUTO: 42.3 % (ref 37.5–51)
HDLC SERPL-MCNC: 55 MG/DL (ref 40–60)
HGB BLD-MCNC: 14.2 G/DL (ref 13–17.7)
IMM GRANULOCYTES # BLD AUTO: 0.08 10*3/MM3 (ref 0–0.05)
IMM GRANULOCYTES NFR BLD AUTO: 1.1 % (ref 0–0.5)
LDLC SERPL CALC-MCNC: 68 MG/DL (ref 0–100)
LDLC/HDLC SERPL: 1.14 {RATIO}
LYMPHOCYTES # BLD AUTO: 1.3 10*3/MM3 (ref 0.7–3.1)
LYMPHOCYTES NFR BLD AUTO: 18.6 % (ref 19.6–45.3)
MCH RBC QN AUTO: 32.6 PG (ref 26.6–33)
MCHC RBC AUTO-ENTMCNC: 33.6 G/DL (ref 31.5–35.7)
MCV RBC AUTO: 97.2 FL (ref 79–97)
MICROALBUMIN/CREAT UR: NORMAL MG/G{CREAT}
MONOCYTES # BLD AUTO: 0.45 10*3/MM3 (ref 0.1–0.9)
MONOCYTES NFR BLD AUTO: 6.4 % (ref 5–12)
NEUTROPHILS NFR BLD AUTO: 5.06 10*3/MM3 (ref 1.7–7)
NEUTROPHILS NFR BLD AUTO: 72.3 % (ref 42.7–76)
NRBC BLD AUTO-RTO: 0 /100 WBC (ref 0–0.2)
PLATELET # BLD AUTO: 267 10*3/MM3 (ref 140–450)
PMV BLD AUTO: 9.2 FL (ref 6–12)
POTASSIUM SERPL-SCNC: 4.5 MMOL/L (ref 3.5–5.2)
PROT SERPL-MCNC: 7.4 G/DL (ref 6–8.5)
PSA SERPL-MCNC: 0.74 NG/ML (ref 0–4)
RBC # BLD AUTO: 4.35 10*6/MM3 (ref 4.14–5.8)
SODIUM SERPL-SCNC: 137 MMOL/L (ref 136–145)
TRIGL SERPL-MCNC: 171 MG/DL (ref 0–150)
VLDLC SERPL-MCNC: 29 MG/DL (ref 5–40)
WBC NRBC COR # BLD AUTO: 7 10*3/MM3 (ref 3.4–10.8)

## 2024-07-24 PROCEDURE — 82043 UR ALBUMIN QUANTITATIVE: CPT

## 2024-07-24 PROCEDURE — 99214 OFFICE O/P EST MOD 30 MIN: CPT | Performed by: NURSE PRACTITIONER

## 2024-07-24 PROCEDURE — 85025 COMPLETE CBC W/AUTO DIFF WBC: CPT

## 2024-07-24 PROCEDURE — G0103 PSA SCREENING: HCPCS

## 2024-07-24 PROCEDURE — 82570 ASSAY OF URINE CREATININE: CPT

## 2024-07-24 PROCEDURE — 80053 COMPREHEN METABOLIC PANEL: CPT

## 2024-07-24 PROCEDURE — 80061 LIPID PANEL: CPT

## 2024-07-24 NOTE — PROGRESS NOTES
Luis Felipe Siegel  1955  [id number]  Patient Care Team:  Vi Solis APRN as PCP - General (Internal Medicine)  Moe Santacruz MD as Consulting Physician (Urology)  Radha Benitez MD as Consulting Physician (Cardiology)    Luis Felipe Siegel is a pleasant 69 y.o. male who presents for evaluation of Hypertension and Hyperlipidemia    Chief Complaint   Patient presents with    Hypertension    Hyperlipidemia       HPI:   History of Present Illness  Luis Felipe Siegel is a pleasant 69 y.o. male who has a pertinent medical history of asthma, A. Fib (Eliquis), hyperlipidemia, and hypertension. The patient presents today for a follow up. He now follows with cardiology (Emmanuel) and urology (Noam). His colonoscopy is not due until 2026.     Blood pressure is elevated in office, with a reading of 172/84 today, higher than his usual readings. He monitors his blood pressure 2 to 3 times a week, which typically reads in the 120s/60s. He discontinued his valsartan medication in March due to recurrent episodes of lightheadedness and hypotension, systolic pressure in the 90s. His current medications include Bystolic 5 mg daily and Eliquis 5 mg twice daily. He denies experiencing leg swelling, chest pain, or shortness of breath. He has lost weight, currently weighing 202 pounds. He tested positive for COVID-19 two weeks ago, but was asymptomatic for over a week. He was able to maintain an active lifestyle, walking 8 to 10 miles daily playing golf 8 hrs a day in [place] last week. He has never had whitecoat hypertension.  No recent episodes of A-fib that he has recognized.    Results      Past Medical History:   Diagnosis Date    Abnormal ECG 12/22/2021    New Afib    Arrhythmia 12/22/2021    Afib    Asthma     Atrial fibrillation     Bronchospasm      to [facility]    Coronary artery disease 12/21/2021    Transient atrial fibrillation    COVID-19 08/2022    Hyperlipidemia     Hypertension     Positive PPD     in 1983     "Ureteral disorder     Urinary retention 11/22/2019     Past Surgical History:   Procedure Laterality Date    COLONOSCOPY  12/2021    NO PAST SURGERIES       Family History   Problem Relation Age of Onset    COPD Mother     Cancer Mother         lung?    Atrial fibrillation Father     Hypertension Father     Arrhythmia Father     No Known Problems Brother     Hypertension Maternal Grandmother     Hypertension Maternal Grandfather     No Known Problems Paternal Grandmother     Prostate cancer Paternal Grandfather      Social History     Tobacco Use   Smoking Status Never    Passive exposure: Past   Smokeless Tobacco Never     No Known Allergies    Current Outpatient Medications:     albuterol sulfate  (90 Base) MCG/ACT inhaler, Inhale 2 puffs 4 (Four) Times a Day., Disp: 25.5 g, Rfl: 6    Eliquis 5 MG tablet tablet, TAKE ONE TABLET BY MOUTH EVERY 12 HOURS, Disp: 60 tablet, Rfl: 4    nebivolol (BYSTOLIC) 5 MG tablet, TAKE 1 TABLET BY MOUTH DAILY, Disp: 90 tablet, Rfl: 3    Review of Systems  Review of systems was completed, and pertinent findings are noted in the HPI.  /88   Pulse 68   Temp 98.4 °F (36.9 °C) (Temporal)   Ht 180.3 cm (70.98\")   Wt 91.6 kg (202 lb)   BMI 28.19 kg/m²     Physical Exam  Constitutional:       Appearance: He is well-developed.   HENT:      Head: Normocephalic and atraumatic.   Eyes:      Conjunctiva/sclera: Conjunctivae normal.      Pupils: Pupils are equal, round, and reactive to light.   Cardiovascular:      Rate and Rhythm: Normal rate and regular rhythm.      Pulses: Normal pulses.      Heart sounds: Normal heart sounds.   Pulmonary:      Effort: Pulmonary effort is normal.      Breath sounds: Normal breath sounds.   Musculoskeletal:         General: Normal range of motion.      Cervical back: Normal range of motion and neck supple.      Right lower leg: No edema.      Left lower leg: No edema.   Skin:     General: Skin is warm and dry.   Neurological:      Mental " Status: He is alert and oriented to person, place, and time.   Psychiatric:         Mood and Affect: Mood normal.         Behavior: Behavior normal.         Thought Content: Thought content normal.         Judgment: Judgment normal.       Physical Exam  Vital Signs  Vitals show a blood pressure of 170/88.    PHQ-9 Total Score:      Assessment/Plan:  Assessment & Plan  1. Hypertension.  He was advised to monitor his blood pressure daily over the next few days.  Labs today.  Should his blood pressure remain elevated (140/80 or higher), restart valsartan 80 mg daily.  He has a supply at home.  Send BP readings via email in 1 to 2 weeks.    2. Atrial fibrillation.  Continuation of Eliquis 5 mg twice daily was advised.    Follow-up  A follow-up visit is scheduled for 6 months from now.  There are no Patient Instructions on file for this visit.  Plan of care reviewed with patient at the conclusion of today's visit. Education was provided regarding diagnosis, management and any prescribed or recommended OTC medications.  Patient verbalizes understanding of and agreement with management plan.    Return in about 6 months (around 1/24/2025) for Annual physical, Labs next visit, Labs this visit.    Dictated Utilizing Dragon Dictation.    TARI Leonardo       Patient or patient representative verbalized consent for the use of Ambient Listening during the visit with  TARI Leonardo for chart documentation. 7/24/2024  10:04 EDT

## 2024-10-11 ENCOUNTER — OFFICE VISIT (OUTPATIENT)
Dept: CARDIOLOGY | Facility: CLINIC | Age: 69
End: 2024-10-11
Payer: COMMERCIAL

## 2024-10-11 VITALS
HEART RATE: 73 BPM | SYSTOLIC BLOOD PRESSURE: 150 MMHG | WEIGHT: 203.8 LBS | BODY MASS INDEX: 28.53 KG/M2 | DIASTOLIC BLOOD PRESSURE: 70 MMHG | HEIGHT: 71 IN | OXYGEN SATURATION: 99 %

## 2024-10-11 DIAGNOSIS — I48.0 PAROXYSMAL ATRIAL FIBRILLATION: Primary | ICD-10-CM

## 2024-10-11 DIAGNOSIS — I10 ESSENTIAL HYPERTENSION: ICD-10-CM

## 2024-10-11 DIAGNOSIS — E78.5 DYSLIPIDEMIA: ICD-10-CM

## 2024-10-11 PROCEDURE — 99214 OFFICE O/P EST MOD 30 MIN: CPT | Performed by: INTERNAL MEDICINE

## 2024-10-11 RX ORDER — NEBIVOLOL 5 MG/1
5 TABLET ORAL DAILY
Qty: 90 TABLET | Refills: 3 | Status: SHIPPED | OUTPATIENT
Start: 2024-10-11

## 2024-10-11 RX ORDER — VALSARTAN 80 MG/1
80 TABLET ORAL DAILY
Qty: 90 TABLET | Refills: 3 | Status: SHIPPED | OUTPATIENT
Start: 2024-10-11

## 2024-10-11 NOTE — PROGRESS NOTES
[facility]    Encounter Date: 10/11/2024    Patient ID: Luis Felipe Siegel is a 69 y.o. male.  : 1955     PCP: Vi Solis APRN       Chief Complaint: Atrial Fibrillation and Hypertension      PROBLEM LIST:  Atrial fibrillation  Echo, 2022: EF 60%, TR with RVSP 19mmHg  Stress MPS, 2022: No evidence of ischemia. Perfusion portion revealed inferior myocardiac perfusion defect that was worse with rest imaging prior to attenuation correction and completely absent after correction, suggesting artifact  48h Holter, 2022: SR. Rare APCs and PVCs. Rare SVT up to 14 beats. No Afib. No symptoms.   Holter, 2024: SR. Occasional PACs. Rare SVT/PAT up to 20 beats. No events.   Hypertension  Asthma    History of Present Illness  Patient presents today for a follow-up with a history of PAF and cardiac risk factors. Since last visit, patient has been doing well overall from a cardiovascular standpoint. He monitors his blood pressure regularly at home and notes it is typically 120's/70's mmHg. Patient states he has not had any recent symptoms of atrial fibrillation. He states he discontinued taking valsartan 160 mg due to dizziness as his blood pressure would be as low as 90 mmHg systolic.  He then started taking 80 mg but had the similar problems.  States that today is the first time in months that his blood pressure is higher than 130 systolic.  Patient stays busy and active by walking and playing golf. He denies chest pain, shortness of breath, orthopnea, palpitations, edema, and syncope.     No Known Allergies      Current Outpatient Medications:     albuterol sulfate  (90 Base) MCG/ACT inhaler, Inhale 2 puffs 4 (Four) Times a Day., Disp: 25.5 g, Rfl: 6    Eliquis 5 MG tablet tablet, TAKE ONE TABLET BY MOUTH EVERY 12 HOURS, Disp: 60 tablet, Rfl: 4    nebivolol (BYSTOLIC) 5 MG tablet, TAKE 1 TABLET BY MOUTH DAILY, Disp: 90 tablet, Rfl: 3    The  "following portions of the patient's history were reviewed and updated as appropriate: allergies, current medications, past family history, past medical history, past social history, past surgical history and problem list.    ROS  Review of Systems   14 point ROS negative except for that listed in the HPI.         Objective:     /70   Pulse 73   Ht 180.3 cm (71\")   Wt 92.4 kg (203 lb 12.8 oz)   SpO2 99%   BMI 28.42 kg/m²      Physical Exam  Constitutional: Patient appears well-developed and well-nourished.   HENT: HEENT exam unremarkable.   Neck: Neck supple. No JVD present. No carotid bruits.   Cardiovascular: Normal rate, regular rhythm and normal heart sounds. No murmur heard.   2+ symmetric pulses.   Pulmonary/Chest: Breath sounds normal. Does not exhibit tenderness.   Abdominal: Abdomen benign.   Musculoskeletal: Does not exhibit edema.   Neurological: Neurological exam unremarkable.   Vitals reviewed.    Data Review:   Lab Results   Component Value Date    GLUCOSE 109 (H) 07/24/2024    BUN 23 07/24/2024    CREATININE 1.01 07/24/2024    EGFR 81.0 07/24/2024    BCR 22.8 07/24/2024     07/24/2024    K 4.5 07/24/2024    CO2 24.0 07/24/2024    CALCIUM 8.9 07/24/2024    ALBUMIN 4.2 07/24/2024    AST 19 07/24/2024    ALT 20 07/24/2024     Lab Results   Component Value Date    CHOL 152 07/24/2024    TRIG 171 (H) 07/24/2024    HDL 55 07/24/2024    LDL 68 07/24/2024      Lab Results   Component Value Date    WBC 7.00 07/24/2024    RBC 4.35 07/24/2024    HGB 14.2 07/24/2024    HCT 42.3 07/24/2024    MCV 97.2 (H) 07/24/2024     07/24/2024     Lab Results   Component Value Date    TSH 3.060 01/18/2024     Lab Results   Component Value Date    HGBA1C 5.70 (H) 01/03/2024        Procedures       Advance Care Planning   ACP discussion was declined by the patient. Patient does not have an advance directive, declines further assistance.           Assessment:      Diagnosis Plan   1. Paroxysmal atrial " fibrillation, maintaining sinus rhythm. Stable and asymptomatic. Continue on Eliquis 5 mg BID for stroke prophylaxis.  Continue Bystolic for rate/rhythm control.      2. Essential hypertension  Elevated today. Start on valsartan 80 mg daily for hypertension. Continue on nebivolol 5 mg daily for hypertension.  Patient concerned that his blood pressure may drop too low on valsartan 80 and I have advised him to take it half or 1 tablet depending on blood pressure readings (patient is a physician/nephrologist).      3. Dyslipidemia  Well controlled with diet and exercise..         Plan:   Stable cardiac status.   Patient was encouraged to continue to be active and have a healthy diet.  Closely monitor blood pressure at home with goal of less than 130 systolic.  Start on valsartan 80 mg daily for hypertension.  May take half or 1 tablet using his own discretion depending on symptoms and blood pressure control.  Continue rest of the current medications.   FU in 12 MO, sooner as needed.  Thank you for allowing us to participate in the care of your patient.     Scribed for Radha Benitez MD by Zulma Lara. 10/11/2024 09:16 EDT    I, Radha Benitez MD, personally performed the services described in this documentation as scribed by the above named individual in my presence, and it is both accurate and complete.  10/11/2024  09:24 EDT      Please note that portions of this note may have been completed with a voice recognition program. Efforts were made to edit the dictations, but occasionally words are mistranscribed.

## 2025-01-07 RX ORDER — VALSARTAN 160 MG/1
160 TABLET ORAL DAILY
Qty: 90 TABLET | Refills: 1 | Status: SHIPPED | OUTPATIENT
Start: 2025-01-07

## 2025-01-07 RX ORDER — VALSARTAN 80 MG/1
80 TABLET ORAL DAILY
Qty: 90 TABLET | Refills: 3 | OUTPATIENT
Start: 2025-01-07

## 2025-02-04 NOTE — PROGRESS NOTES
Luis Felipe Siegel  1955  [id number]  Patient Care Team:  Vi Solis APRN as PCP - General (Internal Medicine)  Moe Santacruz MD as Consulting Physician (Urology)  Radha Benitez MD as Consulting Physician (Cardiology)    Luis Felipe Siegel is a 69 y.o. pleasant male here today for annual physical.  Chief Complaint   Patient presents with    Annual Exam       HPI:   History of Present Illness  Luis Felipe is a pleasant male who presents for a physical exam.  He has a pertinent medical history including asthma atrial fibrillation (Eliquis), hyperlipidemia, and hypertension.  He follows with cardiology (Emmanuel) and urology (Noam).  He has a nephrologist, working in utilization review.    He reports an overall good health status, with a slight weight gain of approximately 5 pounds according to his home scales. He experiences occasional reflux, triggered by certain foods.  Rarely has to use any OTC medications for this. He reports no gastrointestinal issues such as constipation or diarrhea.     He plans to schedule with dermatology due to the development of actinic keratosis on his face.     He has annual check-ups with Dr. Santacruz, urologist, and reports no urinary issues. He has experienced urinary retention twice in the past, necessitating Franco catheter placement.    Hypertension: He engages in regular physical activity, including walking 2 to 2.5 miles daily, yard work, and golfing. He uses a treadmill during the winter but finds it less enjoyable than outdoor walking. He reports no chest pain or shortness of breath.He has been managing his blood pressure with a regimen of valsartan, alternating between 80 mg and 160 mg doses. He reports experiencing lightheadedness when taking the 160 mg dose daily, which he attributes to low blood pressure. His current regimen involves taking 80 mg on Mondays, Wednesdays, and Fridays, and 160 mg on the remaining days. This regimen has been effective in maintaining his  blood pressure within normal limits, with systolic readings typically ranging from 115 to 130 and diastolic readings in the 60s. He reports occasional episodes of lightheadedness, which he associates with low blood pressure. He also reports visual disturbances upon standing, but his blood pressure has not been low since adjusting his valsartan dosage. He continues to take Bystolic 5 mg daily. He recalls that his blood pressure was elevated during a visit to Dr. Benitez in 09/2024, prompting the addition of valsartan to his treatment regimen. He notes a seasonal pattern to his blood pressure, with increases during the winter months when he is less active. He managed well on Bystolic alone throughout the summer. He requests a prescription for 160 mg tablets, which he can then halve as needed. He admits to not drinking much water, particularly on workdays.    He is currently on Eliquis. He is unaware of any atrial fibrillation episodes and reports regular heart rates. He reports no symptoms of shortness of breath or fatigue. He reports feeling slightly deconditioned after playing golf and walking nine holes but otherwise maintains his usual walking distance without difficulty. He does not use his inhaler before golfing or walking.    He uses albuterol several times a week, mostly at night. He does not experience wheezing but feels better after using the inhaler. He took allergy shots for a while due to rhinorrhea, which he no longer experiences. He believes his symptoms are triggered by exposure to cat litter more than the cats themselves, as he experiences nasal congestion and runny nose when cleaning the litter box, even while wearing a mask. He does not experience symptoms when in contact with the cats.      IMMUNIZATIONS  He has not received the shingles or RSV vaccines.    Past Medical History:   Diagnosis Date    Abnormal ECG 12/22/2021    New Afib    Arrhythmia 12/22/2021    Afib    Asthma     Atrial fibrillation   "   Bronchospasm      to [facility]    Coronary artery disease 12/21/2021    Transient atrial fibrillation    COVID-19 08/2022    Hyperlipidemia     Hypertension     Positive PPD     in 1983    Ureteral disorder     Urinary retention 11/22/2019     Past Surgical History:   Procedure Laterality Date    COLONOSCOPY  12/2021    NO PAST SURGERIES       Family History   Problem Relation Age of Onset    COPD Mother     Cancer Mother         lung?    Atrial fibrillation Father     Hypertension Father     Arrhythmia Father         Afib    No Known Problems Brother     Hypertension Maternal Grandmother     Hypertension Maternal Grandfather     No Known Problems Paternal Grandmother     Prostate cancer Paternal Grandfather      Social History     Tobacco Use   Smoking Status Never    Passive exposure: Past   Smokeless Tobacco Never     No Known Allergies    Current Outpatient Medications:     albuterol sulfate  (90 Base) MCG/ACT inhaler, Inhale 2 puffs 4 (Four) Times a Day., Disp: 25.5 g, Rfl: 6    apixaban (Eliquis) 5 MG tablet tablet, Take 1 tablet by mouth Every 12 (Twelve) Hours., Disp: 60 tablet, Rfl: 11    nebivolol (BYSTOLIC) 5 MG tablet, Take 1 tablet by mouth Daily., Disp: 90 tablet, Rfl: 3    valsartan (Diovan) 160 MG tablet, Take 1 tablet by mouth Daily., Disp: 90 tablet, Rfl: 1    Albuterol-Budesonide (Airsupra) 90-80 MCG/ACT aerosol, Inhale 2 puffs Every 4 (Four) to 6 (Six) Hours As Needed (wheezing, shortness of breath, coughing, chest tightness)., Disp: 32.1 g, Rfl: 1    Review of systems was completed, and pertinent findings are noted in the HPI.    /84 (BP Location: Left arm, Patient Position: Sitting, Cuff Size: Adult)   Pulse 64   Temp 98.2 °F (36.8 °C) (Temporal)   Ht 181 cm (71.26\")   Wt 98 kg (216 lb)   BMI 29.91 kg/m²     Physical Exam  Vitals reviewed.   Constitutional:       Appearance: Normal appearance. He is well-developed and overweight.   HENT:      Head: Normocephalic.      " Right Ear: External ear normal.      Left Ear: External ear normal.   Eyes:      Conjunctiva/sclera: Conjunctivae normal.      Pupils: Pupils are equal, round, and reactive to light.   Cardiovascular:      Rate and Rhythm: Normal rate and regular rhythm.      Pulses: Normal pulses.      Heart sounds: Normal heart sounds.   Pulmonary:      Effort: Pulmonary effort is normal.      Breath sounds: Normal breath sounds.   Abdominal:      General: Abdomen is flat. Bowel sounds are normal.      Palpations: Abdomen is soft.   Musculoskeletal:         General: Normal range of motion.      Cervical back: Normal range of motion and neck supple.      Right lower leg: No edema.      Left lower leg: No edema.   Skin:     General: Skin is warm and dry.   Neurological:      Mental Status: He is alert and oriented to person, place, and time.   Psychiatric:         Mood and Affect: Mood normal.         Behavior: Behavior normal.         Thought Content: Thought content normal.         Judgment: Judgment normal.         PHQ-9 Total Score:         ECG 12 Lead    Date/Time: 2/5/2025 10:17 AM  Performed by: Vi Solis APRN    Authorized by: Vi Solis APRN  Comparison: compared with previous ECG from 1/18/2024  Similar to previous ECG  Rhythm: sinus rhythm  BPM: 63    Clinical impression: normal ECG           Assessment/Plan:  Diagnoses and all orders for this visit:    1. Routine medical exam (Primary)    2. Essential hypertension  -     CBC & Differential; Future  -     Comprehensive Metabolic Panel; Future  -     Microalbumin / Creatinine Urine Ratio - Urine, Clean Catch; Future  -     ECG 12 Lead    3. Mixed hyperlipidemia  -     Lipid Panel; Future  -     TSH Rfx On Abnormal To Free T4; Future    4. Overweight (BMI 25.0-29.9)    5. Paroxysmal atrial fibrillation    6. Persistent asthma without complication, unspecified asthma severity    Other orders  -     valsartan (Diovan) 160 MG tablet; Take 1 tablet by mouth  Daily.  Dispense: 90 tablet; Refill: 1  -     Albuterol-Budesonide (Airsupra) 90-80 MCG/ACT aerosol; Inhale 2 puffs Every 4 (Four) to 6 (Six) Hours As Needed (wheezing, shortness of breath, coughing, chest tightness).  Dispense: 32.1 g; Refill: 1       Assessment & Plan  1. Health maintenance.  He is scheduled for a colonoscopy in 2026. He plans to schedule an appointment with dermatology.  He is advised to get the shingles vaccine, which can be administered at the pharmacy or during a future visit.    2. Hypertension.  He is currently on a regimen of valsartan 80 mg on Mondays, Wednesdays, and Fridays, and 160 mg on the other days.  A prescription for valsartan 160 mg will be sent to [facility], and he will cut the tablets in half as needed. He is advised to monitor his blood pressure and adjust the dosage if necessary. He is also advised to increase water intake, especially during periods of increased physical activity, to avoid dehydration.    3. Atrial fibrillation.   He is advised to continue his current medication regimen and monitor for any new symptoms.  EKG today normal sinus rhythm.    4.  Persistent asthma.  His albuterol will be switched to Airsupra, which contains a low-dose steroid. [facility]. He is instructed to use Airsupra as needed, similar to his current albuterol regimen.          There are no Patient Instructions on file for this visit.    Counseling/Anticipatory Guidance:   Plan of care reviewed with patient at the conclusion of today's visit. Education was provided in regards to diagnosis, diet and exercise, prostate cancer screening discussed including benefit of early detection, potential need for follow-up, and harms associated with additional management. Patient agrees to screening.    Nutrition, physical activity, healthy weight,ways to reduce stress, adequate sleep, injury prevention, misuse of tobacco, alcohol and drugs, sexual behavior and STD's,  dental health, mental health, and immunizations.    Plan of care reviewed with patient at the conclusion of today's visit. Education was provided regarding diagnosis, management and any prescribed or recommended OTC medications.  Patient verbalizes understanding of and agreement with management plan.    Return in about 6 months (around 8/5/2025) for Next scheduled follow up.    Dictated Utilizing Dragon Dictation      TARI Leonardo  Patient or patient representative verbalized consent for the use of Ambient Listening during the visit with  TARI Leonardo for chart documentation. 2/8/2025  08:39 EST

## 2025-02-05 ENCOUNTER — OFFICE VISIT (OUTPATIENT)
Dept: INTERNAL MEDICINE | Facility: CLINIC | Age: 70
End: 2025-02-05
Payer: COMMERCIAL

## 2025-02-05 ENCOUNTER — LAB (OUTPATIENT)
Dept: LAB | Facility: HOSPITAL | Age: 70
End: 2025-02-05
Payer: COMMERCIAL

## 2025-02-05 VITALS
HEIGHT: 71 IN | DIASTOLIC BLOOD PRESSURE: 84 MMHG | WEIGHT: 216 LBS | TEMPERATURE: 98.2 F | SYSTOLIC BLOOD PRESSURE: 142 MMHG | HEART RATE: 64 BPM | BODY MASS INDEX: 30.24 KG/M2

## 2025-02-05 DIAGNOSIS — I48.0 PAROXYSMAL ATRIAL FIBRILLATION: ICD-10-CM

## 2025-02-05 DIAGNOSIS — I10 ESSENTIAL HYPERTENSION: ICD-10-CM

## 2025-02-05 DIAGNOSIS — E66.3 OVERWEIGHT (BMI 25.0-29.9): ICD-10-CM

## 2025-02-05 DIAGNOSIS — J45.909 PERSISTENT ASTHMA WITHOUT COMPLICATION, UNSPECIFIED ASTHMA SEVERITY: ICD-10-CM

## 2025-02-05 DIAGNOSIS — E78.2 MIXED HYPERLIPIDEMIA: ICD-10-CM

## 2025-02-05 DIAGNOSIS — Z00.00 ROUTINE MEDICAL EXAM: Primary | ICD-10-CM

## 2025-02-05 LAB
ALBUMIN SERPL-MCNC: 4.1 G/DL (ref 3.5–5.2)
ALBUMIN UR-MCNC: <1.2 MG/DL
ALBUMIN/GLOB SERPL: 1.3 G/DL
ALP SERPL-CCNC: 80 U/L (ref 39–117)
ALT SERPL W P-5'-P-CCNC: 27 U/L (ref 1–41)
ANION GAP SERPL CALCULATED.3IONS-SCNC: 8 MMOL/L (ref 5–15)
AST SERPL-CCNC: 22 U/L (ref 1–40)
BASOPHILS # BLD AUTO: 0.06 10*3/MM3 (ref 0–0.2)
BASOPHILS NFR BLD AUTO: 1.1 % (ref 0–1.5)
BILIRUB SERPL-MCNC: 0.4 MG/DL (ref 0–1.2)
BUN SERPL-MCNC: 20 MG/DL (ref 8–23)
BUN/CREAT SERPL: 19.6 (ref 7–25)
CALCIUM SPEC-SCNC: 8.8 MG/DL (ref 8.6–10.5)
CHLORIDE SERPL-SCNC: 105 MMOL/L (ref 98–107)
CHOLEST SERPL-MCNC: 174 MG/DL (ref 0–200)
CO2 SERPL-SCNC: 26 MMOL/L (ref 22–29)
CREAT SERPL-MCNC: 1.02 MG/DL (ref 0.76–1.27)
CREAT UR-MCNC: 162 MG/DL
DEPRECATED RDW RBC AUTO: 45.6 FL (ref 37–54)
EGFRCR SERPLBLD CKD-EPI 2021: 80.1 ML/MIN/1.73
EOSINOPHIL # BLD AUTO: 0.14 10*3/MM3 (ref 0–0.4)
EOSINOPHIL NFR BLD AUTO: 2.5 % (ref 0.3–6.2)
ERYTHROCYTE [DISTWIDTH] IN BLOOD BY AUTOMATED COUNT: 12.8 % (ref 12.3–15.4)
GLOBULIN UR ELPH-MCNC: 3.2 GM/DL
GLUCOSE SERPL-MCNC: 112 MG/DL (ref 65–99)
HCT VFR BLD AUTO: 41.9 % (ref 37.5–51)
HDLC SERPL-MCNC: 48 MG/DL (ref 40–60)
HGB BLD-MCNC: 14.6 G/DL (ref 13–17.7)
IMM GRANULOCYTES # BLD AUTO: 0.03 10*3/MM3 (ref 0–0.05)
IMM GRANULOCYTES NFR BLD AUTO: 0.5 % (ref 0–0.5)
LDLC SERPL CALC-MCNC: 94 MG/DL (ref 0–100)
LDLC/HDLC SERPL: 1.85 {RATIO}
LYMPHOCYTES # BLD AUTO: 1.29 10*3/MM3 (ref 0.7–3.1)
LYMPHOCYTES NFR BLD AUTO: 23.2 % (ref 19.6–45.3)
MCH RBC QN AUTO: 33.6 PG (ref 26.6–33)
MCHC RBC AUTO-ENTMCNC: 34.8 G/DL (ref 31.5–35.7)
MCV RBC AUTO: 96.5 FL (ref 79–97)
MICROALBUMIN/CREAT UR: NORMAL MG/G{CREAT}
MONOCYTES # BLD AUTO: 0.51 10*3/MM3 (ref 0.1–0.9)
MONOCYTES NFR BLD AUTO: 9.2 % (ref 5–12)
NEUTROPHILS NFR BLD AUTO: 3.52 10*3/MM3 (ref 1.7–7)
NEUTROPHILS NFR BLD AUTO: 63.5 % (ref 42.7–76)
NRBC BLD AUTO-RTO: 0 /100 WBC (ref 0–0.2)
PLATELET # BLD AUTO: 211 10*3/MM3 (ref 140–450)
PMV BLD AUTO: 9.6 FL (ref 6–12)
POTASSIUM SERPL-SCNC: 4.8 MMOL/L (ref 3.5–5.2)
PROT SERPL-MCNC: 7.3 G/DL (ref 6–8.5)
RBC # BLD AUTO: 4.34 10*6/MM3 (ref 4.14–5.8)
SODIUM SERPL-SCNC: 139 MMOL/L (ref 136–145)
TRIGL SERPL-MCNC: 187 MG/DL (ref 0–150)
TSH SERPL DL<=0.05 MIU/L-ACNC: 2.55 UIU/ML (ref 0.27–4.2)
VLDLC SERPL-MCNC: 32 MG/DL (ref 5–40)
WBC NRBC COR # BLD AUTO: 5.55 10*3/MM3 (ref 3.4–10.8)

## 2025-02-05 PROCEDURE — 84443 ASSAY THYROID STIM HORMONE: CPT

## 2025-02-05 PROCEDURE — 85025 COMPLETE CBC W/AUTO DIFF WBC: CPT

## 2025-02-05 PROCEDURE — 80053 COMPREHEN METABOLIC PANEL: CPT

## 2025-02-05 PROCEDURE — 80061 LIPID PANEL: CPT

## 2025-02-05 PROCEDURE — 82043 UR ALBUMIN QUANTITATIVE: CPT

## 2025-02-05 PROCEDURE — 82570 ASSAY OF URINE CREATININE: CPT

## 2025-02-05 RX ORDER — VALSARTAN 160 MG/1
160 TABLET ORAL DAILY
Qty: 90 TABLET | Refills: 1 | Status: SHIPPED | OUTPATIENT
Start: 2025-02-05

## 2025-02-05 RX ORDER — ALBUTEROL SULFATE AND BUDESONIDE 90; 80 UG/1; UG/1
2 AEROSOL, METERED RESPIRATORY (INHALATION)
Qty: 32.1 G | Refills: 1 | Status: SHIPPED | OUTPATIENT
Start: 2025-02-05

## 2025-02-06 DIAGNOSIS — R73.01 IMPAIRED FASTING GLUCOSE: Primary | ICD-10-CM

## 2025-02-08 PROBLEM — E78.5 DYSLIPIDEMIA: Status: RESOLVED | Noted: 2024-10-11 | Resolved: 2025-02-08

## 2025-02-08 PROBLEM — J45.909 PERSISTENT ASTHMA WITHOUT COMPLICATION: Status: ACTIVE | Noted: 2025-02-08

## 2025-05-05 RX ORDER — ALBUTEROL SULFATE AND BUDESONIDE 90; 80 UG/1; UG/1
2 AEROSOL, METERED RESPIRATORY (INHALATION)
Qty: 32.1 G | Refills: 1 | Status: SHIPPED | OUTPATIENT
Start: 2025-05-05

## 2025-05-30 RX ORDER — NEBIVOLOL 5 MG/1
5 TABLET ORAL DAILY
Qty: 90 TABLET | Refills: 3 | Status: SHIPPED | OUTPATIENT
Start: 2025-05-30

## 2025-05-30 NOTE — TELEPHONE ENCOUNTER
Rx Refill Note  Requested Prescriptions     Pending Prescriptions Disp Refills    nebivolol (BYSTOLIC) 5 MG tablet [Pharmacy Med Name: Nebivolol HCl 5 MG Oral Tablet] 90 tablet 3     Sig: TAKE 1 TABLET BY MOUTH DAILY      Last office visit with prescribing clinician: 2/5/2025   Last telemedicine visit with prescribing clinician: Visit date not found   Next office visit with prescribing clinician: 8/6/2025                         Would you like a call back once the refill request has been completed: [] Yes [] No    If the office needs to give you a call back, can they leave a voicemail: [] Yes [] No    Tennille Schmitt LPN  05/30/25, 08:32 EDT